# Patient Record
Sex: MALE | Race: WHITE | NOT HISPANIC OR LATINO | Employment: FULL TIME | URBAN - NONMETROPOLITAN AREA
[De-identification: names, ages, dates, MRNs, and addresses within clinical notes are randomized per-mention and may not be internally consistent; named-entity substitution may affect disease eponyms.]

---

## 2017-01-04 ENCOUNTER — HOSPITAL ENCOUNTER (EMERGENCY)
Facility: HOSPITAL | Age: 22
Discharge: HOME | End: 2017-01-04
Attending: PHYSICIAN ASSISTANT | Admitting: PHYSICIAN ASSISTANT
Payer: COMMERCIAL

## 2017-01-04 VITALS
OXYGEN SATURATION: 97 % | TEMPERATURE: 97.9 F | DIASTOLIC BLOOD PRESSURE: 81 MMHG | HEART RATE: 76 BPM | RESPIRATION RATE: 14 BRPM | SYSTOLIC BLOOD PRESSURE: 137 MMHG

## 2017-01-04 DIAGNOSIS — S46.911A MUSCLE STRAIN OF SCAPULAR REGION, RIGHT, INITIAL ENCOUNTER: ICD-10-CM

## 2017-01-04 PROCEDURE — 73030 X-RAY EXAM OF SHOULDER: CPT | Mod: TC,RT

## 2017-01-04 PROCEDURE — 99213 OFFICE O/P EST LOW 20 MIN: CPT

## 2017-01-04 PROCEDURE — 99213 OFFICE O/P EST LOW 20 MIN: CPT | Performed by: PHYSICIAN ASSISTANT

## 2017-01-04 RX ORDER — CYCLOBENZAPRINE HCL 10 MG
5-10 TABLET ORAL 3 TIMES DAILY PRN
Qty: 20 TABLET | Refills: 0 | Status: SHIPPED | OUTPATIENT
Start: 2017-01-04 | End: 2017-01-10

## 2017-01-04 RX ORDER — KETOROLAC TROMETHAMINE 10 MG/1
10 TABLET, FILM COATED ORAL EVERY 6 HOURS PRN
Qty: 20 TABLET | Refills: 0 | Status: SHIPPED | OUTPATIENT
Start: 2017-01-04 | End: 2017-01-09

## 2017-01-04 ASSESSMENT — ENCOUNTER SYMPTOMS
CARDIOVASCULAR NEGATIVE: 1
JOINT SWELLING: 0
ARTHRALGIAS: 1
CONSTITUTIONAL NEGATIVE: 1
NEUROLOGICAL NEGATIVE: 1
PSYCHIATRIC NEGATIVE: 1
MYALGIAS: 1
BACK PAIN: 0
RESPIRATORY NEGATIVE: 1

## 2017-01-04 NOTE — ED PROVIDER NOTES
History     Chief Complaint   Patient presents with     Shoulder Pain     X 6-7 months off and on     The history is provided by the patient. No  was used.     Javier Martinez is a 21 year old male who presents with right shoulder pain.   MARY: None. Pt reports pain is made worse after activities like shoveling yesterday. Often pain is better after disc-golf.   Pain is described as ripping to sharp and is located right posterior shoulder.  Treatments tried thus far include nothing today.   Patient denies any additional symptoms. NO rashes/shingles.     I have reviewed the Medications, Allergies, Past Medical  History in the Epic system.    Review of Systems   Constitutional: Negative.    Respiratory: Negative.    Cardiovascular: Negative.    Musculoskeletal: Positive for myalgias and arthralgias. Negative for back pain and joint swelling.   Skin: Negative.    Neurological: Negative.    Psychiatric/Behavioral: Negative.        Physical Exam   BP: 137/81 mmHg  Pulse: 76  Temp: 97.9  F (36.6  C)  Resp: 14  SpO2: 97 %  Physical Exam   Constitutional: He is oriented to person, place, and time. He appears well-developed and well-nourished. No distress.   Cardiovascular: Normal rate.    Pulmonary/Chest: Effort normal.   Musculoskeletal:        Right shoulder: He exhibits tenderness.        Arms:  RIGHT shoulder with NO gross deformity or skin lesions. GH NONtender. AC NONtender. 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 Patient can touch opposite shoulder, but causes posterior shoulder pain. Apley scratch to lower thoracic area. Lift off testing hurts posterior shoulder. Empty can NEGATIVE. Neers NEGATIVE. Patient can wiggle fingers. Sensation to light touch intact at fingertips. Cap refill at fingertips 2 seconds.    Neurological: He is alert and oriented to person, place, and time.   Skin: Skin is warm and dry.   Psychiatric: He has a normal mood and affect.   Nursing note and vitals reviewed.      ED  Course   Procedures       RIGHT SHOULDER FOUR VIEWS     FINDINGS:  The acromioclavicular and glenohumeral articulations are  normally aligned.  The distal clavicle, scapula and proximal humerus  appear intact.     IMPRESSION:  NEGATIVE FOUR VIEW EXAMINATION OF THE RIGHT SHOULDER.  Exam Date: Jan 04, 2017 01:41:30 PM  Author: JOHANNY PRATHER  This report is preliminary and transcribed       Assessments & Plan (with Medical Decision Making)     I have reviewed the nursing notes.    I have reviewed the findings, diagnosis, plan and need for follow up with the patient.    Discharge Medication List as of 1/4/2017  2:14 PM      START taking these medications    Details   ketorolac (TORADOL) 10 MG tablet Take 1 tablet (10 mg) by mouth every 6 hours as needed for moderate pain, Disp-20 tablet, R-0, E-Prescribe      cyclobenzaprine (FLEXERIL) 10 MG tablet Take 0.5-1 tablets (5-10 mg) by mouth 3 times daily as needed, Disp-20 tablet, R-0, E-Prescribe             Final diagnoses:   Muscle strain of scapular region, right, initial encounter   Scapula with no bony abnormality on XR. Will treat as muscle strain as above. Pt given home upper back/shoulder exercises if this does NOT make pain worse. He is advised to f/u with sports medicine vs PCP with ongoing pain despite treatment. Seek attention sooner with worsening pain despite treatment. Patient verbally educated and given appropriate education sheets for each of the diagnoses and has no questions.    Christiano Montague PA-C   1/4/2017   6:29 PM    1/4/2017   [facility]      Christiano Montague PA  01/04/17 2401

## 2017-01-04 NOTE — ED NOTES
Pt presents today with a friend for c/o right shoulder pain for 6-7 months but yesterday started having shooting pains down his back from it, was shoveling last night and couldn't sleep due to the pain.

## 2017-01-04 NOTE — ED AVS SNAPSHOT
[facility]    [address]    [place] [address]    Phone:  [phone]                                       Javier Martinez   MRN: [id number]    Department:  [facility]   Date of Visit:  1/4/2017           Patient Information     Date Of Birth          1995        Your diagnoses for this visit were:     Muscle strain of scapular region, right, initial encounter        You were seen by Christiano Montague PA.      Follow-up Information     Please follow up.    Why:  ortho 10-14 days as needed        Discharge Instructions       - Rest, ice/heat rotation  - Toradol 3-4 x daily for 2-3 days  - Flexeril 3x daily for 2-3 days  - Stretches/home rehab  - FYI Topicals: Arnica Cream (5$ at [employer]) and/or Capsaicin. (1/4 teaspoon cayenne pepper in a palmful olive oil and rub in 2-3 times daily for 5-7 days for pain)    * No better in 10-14 days, see sports medicine or primary care    Discharge References/Attachments     MUSCLE STRAIN, EXTREMITY (ENGLISH)         Review of your medicines      START taking        Dose / Directions Last dose taken    cyclobenzaprine 10 MG tablet   Commonly known as:  FLEXERIL   Dose:  5-10 mg   Quantity:  20 tablet        Take 0.5-1 tablets (5-10 mg) by mouth 3 times daily as needed   Refills:  0        ketorolac 10 MG tablet   Commonly known as:  TORADOL   Dose:  10 mg   Quantity:  20 tablet        Take 1 tablet (10 mg) by mouth every 6 hours as needed for moderate pain   Refills:  0          Our records show that you are taking the medicines listed below. If these are incorrect, please call your family doctor or clinic.        Dose / Directions Last dose taken    IBUPROFEN PO   Dose:  800 mg        Take 800 mg by mouth   Refills:  0                Prescriptions were sent or printed at these locations (2 Prescriptions)                   [facility] - [place] - [address]   [address] [facility] [address]    Telephone:   "[phone]   Fax:  [phone]   Hours:                  E-Prescribed (2 of 2)         ketorolac (TORADOL) 10 MG tablet               cyclobenzaprine (FLEXERIL) 10 MG tablet                Procedures and tests performed during your visit     Shoulder XR, G/E 3 views, right      Orders Needing Specimen Collection     None      Pending Results     Date and Time Order Name Status Description    2017 1324 Shoulder XR, G/E 3 views, right In process             Pending Culture Results     No orders found from 1/3/2017 to 2017.            Thank you for choosing [facility]       Thank you for choosing [facility] for your care. Our goal is always to provide you with excellent care. Hearing back from our patients is one way we can continue to improve our services. Please take a few minutes to complete the written survey that you may receive in the mail after you visit with us. Thank you!        [employer] Information     [facility] lets you send messages to your doctor, view your test results, renew your prescriptions, schedule appointments and more. To sign up, go to [email or web address] . Click on \"Log in\" on the left side of the screen, which will take you to the Welcome page. Then click on \"Sign up Now\" on the right side of the page.     You will be asked to enter the access code listed below, as well as some personal information. Please follow the directions to create your username and password.     Your access code is: [email or web address]  Expires: 2017  2:14 PM     Your access code will  in 90 days. If you need help or a new code, please call your [facility] clinic or [phone].        Care EveryWhere ID     This is your Care EveryWhere ID. This could be used by other organizations to access your [facility] medical records  [id number]        After Visit Summary       This is your record. Keep this with you and show to your community pharmacist(s) and doctor(s) at your next visit.                  "

## 2017-01-04 NOTE — ED AVS SNAPSHOT
[facility]    [address]    [place] [address]    Phone:  [phone]                                       Javier Martinez   MRN: [id number]    Department:  [facility]   Date of Visit:  1/4/2017           After Visit Summary Signature Page     I have received my discharge instructions, and my questions have been answered. I have discussed any challenges I see with this plan with the nurse or doctor.    ..........................................................................................................................................  Patient/Patient Representative Signature      ..........................................................................................................................................  Patient Representative Print Name and Relationship to Patient    ..................................................               ................................................  Date                                            Time    ..........................................................................................................................................  Reviewed by Signature/Title    ...................................................              ..............................................  Date                                                            Time

## 2017-01-04 NOTE — DISCHARGE INSTRUCTIONS
- Rest, ice/heat rotation  - Toradol 3-4 x daily for 2-3 days  - Flexeril 3x daily for 2-3 days  - Stretches/home rehab  - FYI Topicals: Arnica Cream (5$ at [employer]) and/or Capsaicin. (1/4 teaspoon cayenne pepper in a palmful olive oil and rub in 2-3 times daily for 5-7 days for pain)    * No better in 10-14 days, see sports medicine or primary care

## 2017-09-29 ENCOUNTER — HOSPITAL ENCOUNTER (EMERGENCY)
Facility: HOSPITAL | Age: 22
Discharge: HOME | End: 2017-09-29
Attending: PHYSICIAN ASSISTANT | Admitting: PHYSICIAN ASSISTANT

## 2017-09-29 VITALS
OXYGEN SATURATION: 98 % | HEART RATE: 76 BPM | SYSTOLIC BLOOD PRESSURE: 132 MMHG | DIASTOLIC BLOOD PRESSURE: 86 MMHG | RESPIRATION RATE: 15 BRPM | TEMPERATURE: 97.6 F

## 2017-09-29 DIAGNOSIS — R10.9 ABDOMINAL PAIN OF UNKNOWN ETIOLOGY: ICD-10-CM

## 2017-09-29 PROCEDURE — 99282 EMERGENCY DEPT VISIT SF MDM: CPT

## 2017-09-29 PROCEDURE — 99282 EMERGENCY DEPT VISIT SF MDM: CPT | Performed by: PHYSICIAN ASSISTANT

## 2017-09-29 ASSESSMENT — ENCOUNTER SYMPTOMS
ABDOMINAL PAIN: 1
FATIGUE: 0
NAUSEA: 1
SHORTNESS OF BREATH: 0
ACTIVITY CHANGE: 0
DIARRHEA: 1
CHILLS: 0
APPETITE CHANGE: 1
VOMITING: 1

## 2017-09-29 NOTE — ED NOTES
Presents with hx of waking at 0600 today with stabbing epigastric pain, nausea and vomited X6. States had one lose stool today.

## 2017-09-29 NOTE — ED AVS SNAPSHOT
[facility]    [address] [address]    Phone:  [phone]                                       Javier Martinez   MRN: [id number]    Department:  [facility]   Date of Visit:  9/29/2017           After Visit Summary Signature Page     I have received my discharge instructions, and my questions have been answered. I have discussed any challenges I see with this plan with the nurse or doctor.    ..........................................................................................................................................  Patient/Patient Representative Signature      ..........................................................................................................................................  Patient Representative Print Name and Relationship to Patient    ..................................................               ................................................  Date                                            Time    ..........................................................................................................................................  Reviewed by Signature/Title    ...................................................              ..............................................  Date                                                            Time

## 2017-09-29 NOTE — ED NOTES
"Ambulated into ED room 1 independently with c/o nausea, vomiting, and diarrhea that started this morning. Patient also c/o abdominal pain rated 5/10 that was like \"indigestion.\" Reports taking tums but says he vomited them up. States he is just here for a doctor's excuse for work because his job requires it. Was going to start an IV but patient requested no IV and no labs and again stated, \"I'm sure it's just a stomach bug but I have to come in for a work excuse, I really don't want all of that.\" Informed JACQUE Esposito. Call light within reach.   "

## 2017-09-29 NOTE — ED AVS SNAPSHOT
[facility]    [address] [address]    Phone:  [phone]                                       Javier Martinez   MRN: [id number]    Department:  [facility]   Date of Visit:  9/29/2017           Patient Information     Date Of Birth          1995        Your diagnoses for this visit were:     Abdominal pain of unknown etiology        You were seen by Maynor Waggoner PA-C.      Follow-up Information     Follow up with [facility].    Specialty:  EMERGENCY MEDICINE    Why:  If symptoms worsen    Contact information:    [address] [address] [phone]    Additional information:    From [place]: Take [address]. Turn left at [address]. Turn left at the first stoplight on [address]. At the first stop sign, take a right onto [address]. Take a left into the parking lot and continue through until you reach the North enterance of the building.       From [place]: Take [address]. Take the [address] ramp towards [place]. Turn left onto [address]. Take a slight right onto [address]. Turn left at the first stoplight on [address]. At the first stop sign, take a right onto [address]. Take a left into the parking lot and continue through until you reach the North enterance of the building.       From [place]: Take [address]. Take a right at [address]. At the first stop sign, take a right onto [address]. Take a left into the parking lot and continue through until you reach the North enterance of the building.         Discharge Instructions       Rest and stay hydrated.    Return here for any worsening symptoms, fevers, or other concerns.        Review of your medicines      Notice     You have not been prescribed any medications.            Orders Needing Specimen Collection     None      Pending Results     No orders found  "from 2017 to 2017.            Pending Culture Results     No orders found from 2017 to 2017.            Thank you for choosing [facility]       Thank you for choosing [facility] for your care. Our goal is always to provide you with excellent care. Hearing back from our patients is one way we can continue to improve our services. Please take a few minutes to complete the written survey that you may receive in the mail after you visit with us. Thank you!        [employer] Information     [employer] lets you send messages to your doctor, view your test results, renew your prescriptions, schedule appointments and more. To sign up, go to [email or web address] . Click on \"Log in\" on the left side of the screen, which will take you to the Welcome page. Then click on \"Sign up Now\" on the right side of the page.     You will be asked to enter the access code listed below, as well as some personal information. Please follow the directions to create your username and password.     Your access code is: [email or web address]  Expires: 2017 10:25 AM     Your access code will  in 90 days. If you need help or a new code, please call your [facility] clinic or [phone].        Care EveryWhere ID     This is your Care EveryWhere ID. This could be used by other organizations to access your [facility] medical records  [id number]        Equal Access to Services     HENNA MEADE AH: Hadii filomena Spears, waaxda abilio, qaybta lisaalivory butcher . So [facility] [phone].    ATENCIÓN: Si habla español, tiene a velazquez disposición servicios gratuitos de asistencia lingüística. Felicia al [phone].    We comply with applicable federal civil rights laws and [place] laws. We do not discriminate on the basis of race, color, national origin, age, disability sex, sexual orientation or gender identity.            After Visit Summary       This is your record. Keep this with you and " show to your community pharmacist(s) and doctor(s) at your next visit.

## 2017-09-29 NOTE — ED PROVIDER NOTES
History     Chief Complaint   Patient presents with     Abdominal Pain     stabbing pain in epigastric area since 0600 this am. Vomited X6.     The history is provided by the patient.     Javier Martinez is a 22 year old male who presented to the ED ambulatory along with SO for evaluation of abdominal pain and vomiting.  One episode of diarrhea.  No fevers.  Generalized abdominal pain.  No hematochezia or hematemesis.  No LUTS.  Symptoms have been present for approx 3 hours.      I have reviewed the Medications, Allergies, Past Medical and Surgical History, and Social History in the Epic system.    Allergies:   Allergies   Allergen Reactions     Hornets Anaphylaxis         No current facility-administered medications on file prior to encounter.   No current outpatient prescriptions on file prior to encounter.    There is no problem list on file for this patient.      No past surgical history on file.    Social History   Substance Use Topics     Smoking status: Not on file     Smokeless tobacco: Not on file     Alcohol use Not on file         There is no immunization history on file for this patient.    BMI: There is no height or weight on file to calculate BMI.      Review of Systems   Constitutional: Positive for appetite change. Negative for activity change, chills and fatigue.   Respiratory: Negative for shortness of breath.    Gastrointestinal: Positive for abdominal pain, diarrhea, nausea and vomiting.   Genitourinary: Negative.    Skin: Negative.        Physical Exam   BP: 132/86  Pulse: 76  Temp: 97.6  F (36.4  C)  Resp: 15  SpO2: 98 %  Physical Exam   Constitutional: He is oriented to person, place, and time. He appears well-developed and well-nourished. No distress.   Cardiovascular: Normal rate and regular rhythm.    Pulmonary/Chest: Effort normal.   Abdominal: Soft. There is no tenderness. There is no guarding.   Neurological: He is alert and oriented to person, place, and time.   Skin: Skin is  "warm and dry.   Nursing note and vitals reviewed.      ED Course     ED Course     Procedures             Critical Care time:  none               Labs Ordered and Resulted from Time of ED Arrival Up to the Time of Departure from the ED - No data to display    Assessments & Plan (with Medical Decision Making)   Gordon refused any testing whatsoever.  He states \"I just need a work excuse.\"  Discussed risks of discharge without work-up.  He voiced understanding.  Return here for any worsening symptoms, fevers, or any other concerns.      I have reviewed the nursing notes.    I have reviewed the findings, diagnosis, plan and need for follow up with the patient.       There are no discharge medications for this patient.      Final diagnoses:   Abdominal pain of unknown etiology       9/29/2017   [facility]     Maynor Waggoner PA-C  09/29/17 1043    "

## 2017-09-29 NOTE — LETTER
[facility]  [address]  [place] [address]  Phone: [phone]    September 29, 2017        Javier Martinez  [address] [place] [address]          To whom it may concern:     Javier Martinez was seen and treated in the [facility] ED on 9/29/17.  Please excuse him from work on 9/29 and 9/30 due to illness.          Please contact me for questions or concerns.      Sincerely,              Maynor Waggoner PA-C

## 2017-09-29 NOTE — ED NOTES
Discharge instructions given. Verbalized understanding. Work excuse for 9/29/17 and 9/30/17 handed to patient with discharge instructions. Patient again stated he did not want any tests or IV and just had to be seen due to a work excuse being required. Ambulated out of ED independently with girlfriend at side.

## 2019-02-12 ENCOUNTER — HOSPITAL ENCOUNTER (EMERGENCY)
Facility: HOSPITAL | Age: 24
Discharge: HOME | End: 2019-02-12
Attending: INTERNAL MEDICINE | Admitting: INTERNAL MEDICINE
Payer: OTHER MISCELLANEOUS

## 2019-02-12 ENCOUNTER — APPOINTMENT (OUTPATIENT)
Dept: GENERAL RADIOLOGY | Facility: HOSPITAL | Age: 24
End: 2019-02-12
Attending: INTERNAL MEDICINE
Payer: OTHER MISCELLANEOUS

## 2019-02-12 VITALS
RESPIRATION RATE: 18 BRPM | TEMPERATURE: 98.5 F | SYSTOLIC BLOOD PRESSURE: 157 MMHG | OXYGEN SATURATION: 100 % | DIASTOLIC BLOOD PRESSURE: 99 MMHG | HEART RATE: 90 BPM

## 2019-02-12 DIAGNOSIS — S66.912A STRAIN OF LEFT WRIST, INITIAL ENCOUNTER: ICD-10-CM

## 2019-02-12 PROCEDURE — 73110 X-RAY EXAM OF WRIST: CPT | Mod: TC,LT

## 2019-02-12 PROCEDURE — 99283 EMERGENCY DEPT VISIT LOW MDM: CPT

## 2019-02-12 PROCEDURE — 99283 EMERGENCY DEPT VISIT LOW MDM: CPT | Mod: Z6 | Performed by: INTERNAL MEDICINE

## 2019-02-12 PROCEDURE — 25000132 ZZH RX MED GY IP 250 OP 250 PS 637: Performed by: INTERNAL MEDICINE

## 2019-02-12 RX ORDER — IBUPROFEN 800 MG/1
800 TABLET, FILM COATED ORAL ONCE
Status: COMPLETED | OUTPATIENT
Start: 2019-02-12 | End: 2019-02-12

## 2019-02-12 RX ORDER — NAPROXEN 500 MG/1
500 TABLET ORAL 2 TIMES DAILY WITH MEALS
Qty: 6 TABLET | Refills: 0 | Status: SHIPPED | OUTPATIENT
Start: 2019-02-12 | End: 2019-02-15

## 2019-02-12 RX ADMIN — IBUPROFEN 800 MG: 800 TABLET ORAL at 02:19

## 2019-02-12 NOTE — ED AVS SNAPSHOT
[facility]  [address] [address]  Phone:  [phone]                                    Javier Martinez   MRN: [id number]    Department[facility]  [facility]   Date of Visit:  2/12/2019           After Visit Summary Signature Page    I have received my discharge instructions, and my questions have been answered. I have discussed any challenges I see with this plan with the nurse or doctor.    ..........................................................................................................................................  Patient/Patient Representative Signature      ..........................................................................................................................................  Patient Representative Print Name and Relationship to Patient    ..................................................               ................................................  Date                                   Time    ..........................................................................................................................................  Reviewed by Signature/Title    ...................................................              ..............................................  Date                                               Time          22EPIC Rev 08/18

## 2019-02-12 NOTE — LETTER
February 12, 2019      To Whom It May Concern:      Javier Martinez was seen in our Emergency Department today, 02/12/19.  I expect his condition to improve over the next 2 days.  He may return to work/school when improved.    Sincerely,        Dm Thomas MD

## 2019-02-12 NOTE — ED NOTES
Discharge instructions given. Verbalized understanding. Signed prescription for Naproxen and signed work excuse handed to patient with discharge paperwork. Rates pain 5/10. Ambulated out of ED independently.

## 2019-02-12 NOTE — ED NOTES
Ambulated into ED room 11 independently with c/o left wrist pain rated 7/10. States was at work when he pressed a button and felt a sudden sharp pain down left arm and now has intense pain with any movement of fingers, hand, or wrist. Call light within reach.

## 2019-02-14 NOTE — PROGRESS NOTES
Pt called with concern regarding the ability to get into Ortho for referral appointment. Pt reports he called the office and they had not received one. Upon looking in chart it appears that Dr. Thomas did want pt to follow up in 1 week with them. Call placed to [place]  with ortho for appointment referral. Called pt back and informed him that they should be calling back and if he did not hear from them to call back again.

## 2019-02-15 ASSESSMENT — ENCOUNTER SYMPTOMS
ABDOMINAL PAIN: 0
FEVER: 0
SHORTNESS OF BREATH: 0

## 2019-02-16 NOTE — ED PROVIDER NOTES
History     Chief Complaint   Patient presents with     Wrist Pain     c/o left wrist pain after pressing button at work and feeling sharp pain go up left arm, states now it is very painful to move hand, fingers, or arm.      Allergies:  Allergies   Allergen Reactions     Bee Venom Anaphylaxis     Hornets Anaphylaxis     Amoxicillin      Augmentin        Problem List:    There are no active problems to display for this patient.       Past Medical History:    No past medical history on file.    Past Surgical History:    No past surgical history on file.    Family History:    No family history on file.    Social History:  Marital Status:  Single [1]  Social History     Tobacco Use     Smoking status: Not on file   Substance Use Topics     Alcohol use: Not on file     Drug use: Not on file        Medications:      naproxen (NAPROSYN) 500 MG tablet         Review of Systems   Constitutional: Negative for fever.   Respiratory: Negative for shortness of breath.    Cardiovascular: Negative for chest pain.   Gastrointestinal: Negative for abdominal pain.   All other systems reviewed and are negative.      Physical Exam   BP: 157/99  Pulse: 90  Temp: 98.5  F (36.9  C)  Resp: 18  SpO2: 100 %      Physical Exam   Constitutional: No distress.   HENT:   Head: Atraumatic.   Mouth/Throat: Oropharynx is clear and moist.   Eyes: Pupils are equal, round, and reactive to light. No scleral icterus.   Cardiovascular: Normal heart sounds and intact distal pulses.   Pulmonary/Chest: Breath sounds normal. No respiratory distress.   Abdominal: Soft. Bowel sounds are normal. There is no tenderness.   Musculoskeletal: He exhibits no edema or tenderness.        Left wrist: He exhibits normal range of motion, no tenderness, no bony tenderness, no swelling, no effusion, no crepitus, no deformity and no laceration.        Left hand: He exhibits normal range of motion, no tenderness, no bony tenderness, no deformity and no laceration. Normal  sensation noted. Normal strength noted.   Skin: Skin is warm. No rash noted. He is not diaphoretic.       ED Course        Procedures                   No results found for this or any previous visit (from the past 24 hour(s)).    Medications   ibuprofen (ADVIL/MOTRIN) tablet 800 mg (800 mg Oral Given 2/12/19 0219)       Assessments & Plan (with Medical Decision Making)   After pressing button in his job, felt left wrist and hand pain  Able to extend and flex all fingers althtough feels pain when  Doing so  Most pain in wrist area  Xray  Negative  Likely soft tissue, ligaament/ tendon  Strain  I advised him to follow with ortho clinic,NSAIDs as needed for pain control  He understood and agreed.   I have reviewed the nursing notes.    I have reviewed the findings, diagnosis, plan and need for follow up with the patient.         Medication List      Started    naproxen 500 MG tablet  Commonly known as:  NAPROSYN  500 mg, Oral, 2 TIMES DAILY WITH MEALS            Final diagnoses:   Strain of left wrist, initial encounter       2/12/2019   [facility]    Dm Thomas MD  02/15/19 4930

## 2019-02-18 ENCOUNTER — OFFICE VISIT (OUTPATIENT)
Dept: ORTHOPEDICS | Facility: OTHER | Age: 24
End: 2019-02-18
Attending: ORTHOPAEDIC SURGERY
Payer: OTHER MISCELLANEOUS

## 2019-02-18 ENCOUNTER — TELEPHONE (OUTPATIENT)
Dept: ORTHOPEDICS | Facility: OTHER | Age: 24
End: 2019-02-18

## 2019-02-18 VITALS
SYSTOLIC BLOOD PRESSURE: 100 MMHG | HEIGHT: 75 IN | OXYGEN SATURATION: 96 % | WEIGHT: 180 LBS | BODY MASS INDEX: 22.38 KG/M2 | TEMPERATURE: 98.7 F | DIASTOLIC BLOOD PRESSURE: 62 MMHG | HEART RATE: 86 BPM

## 2019-02-18 DIAGNOSIS — S63.502A SPRAIN OF LEFT WRIST, INITIAL ENCOUNTER: Primary | ICD-10-CM

## 2019-02-18 PROCEDURE — 99203 OFFICE O/P NEW LOW 30 MIN: CPT | Performed by: ORTHOPAEDIC SURGERY

## 2019-02-18 ASSESSMENT — MIFFLIN-ST. JEOR: SCORE: 1902.1

## 2019-02-18 ASSESSMENT — PAIN SCALES - GENERAL: PAINLEVEL: NO PAIN (1)

## 2019-02-18 NOTE — PROGRESS NOTES
"New Patient Occupational Visit    Chief complaint: Acute left wrist pain at work 2/12/2019    Patient Profile 23-year-old right-handed  at [employer], non-smoker, patient of Marian Wong MD    HPI: He had no problems with his left wrist until 2/12/2019 when, in the course of his employment at [employer], he pushed a button with the proximal volar portion of his dorsiflexed left wrist and felt a painful pop in the region of the DRUJ, followed by a shooting pain running several inches up the volar forearm, numbness and tingling in the fourth and fifth fingers, and the inability to flex or extend the left wrist.    He reported the injury to his supervisor who sent him to the ED at this institution where x-rays were taken.  No bony abnormalities were seen.  He was placed in a splint which she found to be very uncomfortable in which he replaced with a wrist brace he obtained at [facility], and he was prescribed naproxen.  He was given a work note saying he could work \"light duty\" but no definition of light duty was given.  His employer wants a more detailed work note.    Subjective: Today reports that his wrist feels much better.  The pain and numbness and tingling have all resolved except for mild discomfort located near the hamate bone only with heavy lifting.  He states at work he rarely does heavy lifting.    History reviewed. No pertinent past medical history.    History reviewed. No pertinent surgical history.    No current outpatient medications on file.     Allergies   Allergen Reactions     Bee Venom Anaphylaxis     Hornets Anaphylaxis     Amoxicillin      Augmentin      Social history: He does not smoke    Family history: Noncontributory for the presenting problem    ROS: Noncontributory for constitutional, eyes, ENT, cardiovascular, respiratory, GI, endocrine, dermatologic, neurologic, psychiatric, hematologic and  systems; other than what is listed above.    Examination:/62   Pulse 86   " "Temp 98.7  F (37.1  C) (Tympanic)   Ht 1.905 m (6' 3\")   Wt 81.6 kg (180 lb)   SpO2 96%   BMI 22.50 kg/m    Healthy-appearing male with appropriate mood and affect.  He is alert and oriented.  Head: atraumatic and normocephalic.  Sclerae: clear.  Respiratory efforts: unlabored.    His left wrist has no swelling or deformity.  It, and the DRUJ,  are nontender to palpation.  Left wrist range of motion - including pronation and supination - are full and pain-free.  Finger range of motion is full and pain-free.   strength is excellent and pain-free.  The neurovascular status of that hand is intact.    X-ray; plain films of the left wrist performed on 2/12/2019 were reviewed and showed no acute bony abnormalities.    Impression: Soft tissue injury left wrist, mild, significantly improved.     Plan: He was given a work note permitting him to return to work tomorrow with no restrictions, with the option of wearing his wrist brace as needed.  He should return here if his symptoms return.                                                 "

## 2019-02-18 NOTE — TELEPHONE ENCOUNTER
Incoming call from [employer] for patient to see Ortho Dr Holt, apparently the ER providers referred patient but don't see a referral placed.      Thank you,    Nadya FORBES-P  Work Comp [address] [address]  Office: [phone] Fax [phone]

## 2019-02-18 NOTE — NURSING NOTE
"Chief Complaint   Patient presents with     Pain     NPT Left Wrist    Work Comp       Initial /62   Pulse 86   Temp 98.7  F (37.1  C) (Tympanic)   Ht 1.905 m (6' 3\")   Wt 81.6 kg (180 lb)   SpO2 96%   BMI 22.50 kg/m   Estimated body mass index is 22.5 kg/m  as calculated from the following:    Height as of this encounter: 1.905 m (6' 3\").    Weight as of this encounter: 81.6 kg (180 lb).  Medication Reconciliation: complete    Anjali De La Rosa LPN  "

## 2019-10-30 ENCOUNTER — HOSPITAL ENCOUNTER (EMERGENCY)
Facility: HOSPITAL | Age: 24
Discharge: HOME | End: 2019-10-31
Attending: FAMILY MEDICINE | Admitting: FAMILY MEDICINE

## 2019-10-30 DIAGNOSIS — J06.9 VIRAL URI WITH COUGH: Primary | ICD-10-CM

## 2019-10-30 LAB
DEPRECATED S PYO AG THROAT QL EIA: NORMAL
FLUAV+FLUBV RNA SPEC QL NAA+PROBE: NEGATIVE
FLUAV+FLUBV RNA SPEC QL NAA+PROBE: NEGATIVE
RSV RNA SPEC NAA+PROBE: NEGATIVE
SPECIMEN SOURCE: NORMAL
SPECIMEN SOURCE: NORMAL

## 2019-10-30 PROCEDURE — 87880 STREP A ASSAY W/OPTIC: CPT | Performed by: FAMILY MEDICINE

## 2019-10-30 PROCEDURE — 87631 RESP VIRUS 3-5 TARGETS: CPT | Performed by: FAMILY MEDICINE

## 2019-10-30 PROCEDURE — 99283 EMERGENCY DEPT VISIT LOW MDM: CPT

## 2019-10-30 PROCEDURE — 99283 EMERGENCY DEPT VISIT LOW MDM: CPT | Mod: Z6 | Performed by: FAMILY MEDICINE

## 2019-10-30 PROCEDURE — 87081 CULTURE SCREEN ONLY: CPT | Performed by: FAMILY MEDICINE

## 2019-10-30 ASSESSMENT — ENCOUNTER SYMPTOMS
ABDOMINAL PAIN: 0
NECK STIFFNESS: 0
DIZZINESS: 0
CONFUSION: 0
FEVER: 0
WHEEZING: 0
EYE REDNESS: 0
DIFFICULTY URINATING: 0
COLOR CHANGE: 0
ARTHRALGIAS: 0

## 2019-10-30 NOTE — ED AVS SNAPSHOT
[facility]  [address] [address]  Phone:  [phone]                                    Javier Martinez   MRN: [id number]    Department:  [facility]   Date of Visit:  10/30/2019           After Visit Summary Signature Page    I have received my discharge instructions, and my questions have been answered. I have discussed any challenges I see with this plan with the nurse or doctor.    ..........................................................................................................................................  Patient/Patient Representative Signature      ..........................................................................................................................................  Patient Representative Print Name and Relationship to Patient    ..................................................               ................................................  Date                                   Time    ..........................................................................................................................................  Reviewed by Signature/Title    ...................................................              ..............................................  Date                                               Time          22EPIC Rev 08/18

## 2019-10-30 NOTE — LETTER
October 31, 2019      To Whom It May Concern:      Javier Martinez was seen in our Emergency Department today, 10/31/19.  I expect his condition to improve over the next several days.  He may return to work on Monday, 11/4/19.    Sincerely,        Darrell Woo MD

## 2019-10-31 VITALS
RESPIRATION RATE: 16 BRPM | SYSTOLIC BLOOD PRESSURE: 152 MMHG | OXYGEN SATURATION: 98 % | TEMPERATURE: 97.9 F | DIASTOLIC BLOOD PRESSURE: 100 MMHG | HEART RATE: 68 BPM

## 2019-10-31 NOTE — ED NOTES
"Pt reports he has bee, \"hot and cold all day, I have been coughing, I am nauseated and have vomited x2 today, and I am aching all over and I have a migraine and I have a sore throat.\"  Pt states he has had clear nasal drainage but is coughing up clear and green secretions.   "

## 2019-10-31 NOTE — ED NOTES
Discharge teaching done, AVS reviewed with pt, work excuse provided. Pt verbalized understanding and is agreeable with discharge plan. Pt discharged to home.

## 2019-10-31 NOTE — ED PROVIDER NOTES
History     Chief Complaint   Patient presents with     Pharyngitis     HPI  Javier Martinez is a 24 year old man who presents with 1 day of URI S/Sx including subjective fevers/chills, generalized muscle aches, sore throat and headache as well as rhinorrhea and a cough productive of clear and green secretions. No progressive shortness of breath, ear pain or neck stiffness. He was exposed to a friend with similar S/Sx last week.    Allergies:  Allergies   Allergen Reactions     Bee Venom Anaphylaxis     Hornets Anaphylaxis     Amoxicillin      Augmentin        Problem List:    There are no active problems to display for this patient.       Past Medical History:    No past medical history on file.    Past Surgical History:    No past surgical history on file.    Family History:    No family history on file.    Social History:  Marital Status:  Single [1]  Social History     Tobacco Use     Smoking status: Never Smoker     Smokeless tobacco: Never Used   Substance Use Topics     Alcohol use: Not on file     Drug use: Not on file        Medications:    No current outpatient medications on file.        Review of Systems   Constitutional: Negative for fever.   HENT: Negative for congestion.    Eyes: Negative for redness.   Respiratory: Negative for wheezing.    Cardiovascular: Negative for chest pain.   Gastrointestinal: Negative for abdominal pain.   Genitourinary: Negative for difficulty urinating.   Musculoskeletal: Negative for arthralgias and neck stiffness.   Skin: Negative for color change.   Neurological: Negative for dizziness.   Psychiatric/Behavioral: Negative for confusion.       Physical Exam   BP: 140/94  Pulse: 83  Temp: 97.6  F (36.4  C)  Resp: 16  SpO2: 100 %      Physical Exam    General Appearance: well-developed, well-nourished, alert & oriented, no apparent distress.    HEENT: atraumatic. Pupils equal, round & reactive to light, extraocular movements intact. Bilateral ear canals and TMs  clear. Erythematous nasal passages with yellow rhinorrhea. No epistaxis. Erythematous oropharynx without exudate. Moist mucous membranes.    Neck: normal inspection, non-tender, full & painless ROM, supple, no lymphadenopathy or nuchal rigidity. No jugular venous distension.    Cardiovascular: regular rate, rhythm, normal S1 & S2, no murmurs, rubs or gallops.    Respiratory: clear lung sounds with good air entry, no wheezes rales or rhonchi, no acute respiratory distress.    Gastrointestinal: normal inspection, normal bowel sounds, non-tender, no masses or organomegaly.    Extremities: normal inspection, 2+/4+ pulses bilaterally, normal & painless ROM, non-tender, joints normal.    Neurologic: CN II - XII intact, no motor/sensory deficit.    Skin: normal color, no skin rash.    ED Course        Procedures        Results for orders placed or performed during the hospital encounter of 10/30/19 (from the past 24 hour(s))   Rapid strep screen   Result Value Ref Range    Specimen Description Throat     Rapid Strep A Screen       NEGATIVE: No Group A streptococcal antigen detected by immunoassay, await culture report.   Influenza A and B and RSV PCR   Result Value Ref Range    Specimen Description Nasopharyngeal     Influenza A PCR Negative NEG^Negative    Influenza B PCR Negative NEG^Negative    Resp Syncytial Virus Negative NEG^Negative       Medications - No data to display    Assessments & Plan (with Medical Decision Making)   The patient is a 24 year old man with a viral URI with cough.    1) Viral URI with cough - patient will be treated with rest, oral fluids and alternating APAP/ibuprofen.    Plan for PCP follow-up in 5 - 7 days regarding this ER visit. The patient verbalizes agreement with this plan as well as to immediately return to the ER with any new/worsening S/Sx.      I have reviewed the nursing notes.    I have reviewed the findings, diagnosis, plan and need for follow up with the patient.    New  Prescriptions    No medications on file       Final diagnoses:   Viral URI with cough       10/30/2019   [facility]     Darrell Woo MD  10/31/19 0012

## 2019-11-01 LAB
BACTERIA SPEC CULT: NORMAL
SPECIMEN SOURCE: NORMAL

## 2022-06-19 ENCOUNTER — HOSPITAL ENCOUNTER (EMERGENCY)
Facility: HOSPITAL | Age: 27
Discharge: HOME | End: 2022-06-19
Attending: PHYSICIAN ASSISTANT | Admitting: PHYSICIAN ASSISTANT
Payer: COMMERCIAL

## 2022-06-19 ENCOUNTER — APPOINTMENT (OUTPATIENT)
Dept: GENERAL RADIOLOGY | Facility: HOSPITAL | Age: 27
End: 2022-06-19
Attending: PHYSICIAN ASSISTANT
Payer: COMMERCIAL

## 2022-06-19 VITALS
RESPIRATION RATE: 16 BRPM | DIASTOLIC BLOOD PRESSURE: 79 MMHG | OXYGEN SATURATION: 97 % | TEMPERATURE: 97.5 F | HEART RATE: 90 BPM | SYSTOLIC BLOOD PRESSURE: 125 MMHG

## 2022-06-19 DIAGNOSIS — S93.401A SPRAIN OF RIGHT ANKLE, UNSPECIFIED LIGAMENT, INITIAL ENCOUNTER: ICD-10-CM

## 2022-06-19 PROCEDURE — G0463 HOSPITAL OUTPT CLINIC VISIT: HCPCS

## 2022-06-19 PROCEDURE — 73610 X-RAY EXAM OF ANKLE: CPT | Mod: RT

## 2022-06-19 PROCEDURE — 99213 OFFICE O/P EST LOW 20 MIN: CPT | Performed by: PHYSICIAN ASSISTANT

## 2022-06-19 ASSESSMENT — ENCOUNTER SYMPTOMS
JOINT SWELLING: 1
NEUROLOGICAL NEGATIVE: 1
PSYCHIATRIC NEGATIVE: 1
ARTHRALGIAS: 1
CARDIOVASCULAR NEGATIVE: 1
CONSTITUTIONAL NEGATIVE: 1
RESPIRATORY NEGATIVE: 1

## 2022-06-19 NOTE — ED TRIAGE NOTES
Pt presents with c/o right ankle pain onset 2 weeks ago, pt reports pain has not gotten better.Pt ambulatory IND in triage.

## 2022-06-19 NOTE — DISCHARGE INSTRUCTIONS
- Boots and splint. Avoid floppy shoes for now.   - RICE  [facility] in 7-10 days if no improvement or concern for weight bearing/strength/ range of motion.    Orthopedics & Sports Medicine - [facility]  Make an Appointment  [phone]    [facility]  [phone] [address] [facility] [address] [place] [address]

## 2022-06-19 NOTE — ED PROVIDER NOTES
History     Chief Complaint   Patient presents with     Ankle Pain     HPI  Javier Martinez is a 27 year old male who presents with right ankle injury that occurred 2 weeks ago.  Patient reports mis-stepping causing inversion/eversion injury.  He bought an over-the-counter brace that has been helping.  He reports he is grossly asymptomatic when he is wearing his work boots in the brace, however in tennis shoes he does feel a little more pain and notices swelling.  Due to duration of symptoms, he is in today to make sure he did not break anything.    Allergies:  Allergies   Allergen Reactions     Bee Venom Anaphylaxis     Hornets Anaphylaxis     Amoxicillin      Augmentin        Problem List:    There are no problems to display for this patient.       Past Medical History:    No past medical history on file.    Past Surgical History:    No past surgical history on file.    Family History:    No family history on file.    Social History:  Marital Status:  Single [1]  Social History     Tobacco Use     Smoking status: Never Smoker     Smokeless tobacco: Never Used        Medications:    No current outpatient medications on file.        Review of Systems   Constitutional: Negative.    Respiratory: Negative.    Cardiovascular: Negative.    Musculoskeletal: Positive for arthralgias and joint swelling.   Skin: Negative.    Neurological: Negative.    Psychiatric/Behavioral: Negative.        Physical Exam   BP: 125/79  Pulse: 90  Temp: 97.5  F (36.4  C)  Resp: 16  SpO2: 97 %      Physical Exam  Vitals and nursing note reviewed.   Constitutional:       General: He is not in acute distress.     Appearance: He is well-developed.   Cardiovascular:      Rate and Rhythm: Normal rate.   Pulmonary:      Effort: Pulmonary effort is normal.   Musculoskeletal:      Left ankle: Swelling present. Tenderness present over the lateral malleolus and ATF ligament. Normal range of motion (Pain with inversion, but does not limit range  of motion.).        Legs:    Skin:     General: Skin is warm and dry.   Neurological:      Mental Status: He is alert and oriented to person, place, and time.         ED Course       Results for orders placed or performed during the hospital encounter of 06/19/22 (from the past 24 hour(s))   Ankle XR, G/E 3 views, right    Narrative    XR ANKLE RIGHT G/E 3 VIEWS    HISTORY: 27 years Male pain and bruising inside and outside of ankle    COMPARISON: None    TECHNIQUE: Right ankle 3 views    FINDINGS: The mortise is congruent. There is mild lateral soft tissue  edema. There are some bony proliferation of the inferior aspect of the  fibula less likely a small avulsion fracture associated with  ligamentous injury.      Impression    IMPRESSION: Degenerative calcification versus small cortical avulsion  fracture of the tip of the fibula.. Ankle mortise is congruent. There  is no evidence of fracture or dislocation otherwise.    REESE NORMAN MD         SYSTEM ID:  [id number]       Medications - No data to display    Assessments & Plan (with Medical Decision Making)     I have reviewed the nursing notes.I have reviewed the findings, diagnosis, plan and need for follow up with the patient.    New Prescriptions    No medications on file     Final diagnoses:   Sprain of right ankle, unspecified ligament, initial encounter   Calcification versus small avulsion distal fib.  Patient reports that since she has been ambulatory and essentially pain-free at with the brace and in his boot he is comfortable to continue to RICE and monitor for the next week.  I did place referral to orthopedics for follow-up should he have any ongoing symptoms after week 3.  He will seek care with any concerns for pain or swelling out of proportion prior.    6/19/2022   [facility]     Christiano Montague PA  06/19/22 8565

## 2022-06-19 NOTE — ED TRIAGE NOTES
Patient presents to urgent care for right ankle pain that happened 2 weeks ago from walking. Patient states he rolled his ankle and heard a pop than and he iced it and it bruised and than he bought a brace and has been wearing the brace randomly due to the pain.  Patient has been alternating between ibuprofen and tylenol.  Patient came into urgent care because he states it's not getting better after 2 weeks.     Triage Assessment     Row Name 06/19/22 1596       Triage Assessment (Adult)    Airway WDL WDL       Respiratory WDL    Respiratory WDL WDL       Cardiac WDL    Cardiac WDL WDL       Cognitive/Neuro/Behavioral WDL    Cognitive/Neuro/Behavioral WDL WDL

## 2022-08-22 NOTE — PROGRESS NOTES
"  Assessment & Plan     Allergy to bee sting  He is in need of new epipen due to current one being . Reordered.   Resent medication to [facility] instead.  - EPINEPHrine (ANY BX GENERIC EQUIV) 0.3 MG/0.3ML injection 2-pack; Inject 0.3 mLs (0.3 mg) into the muscle as needed for anaphylaxis May repeat one time in 5-15 minutes if response to initial dose is inadequate.    History of anaphylaxis    - EPINEPHrine (ANY BX GENERIC EQUIV) 0.3 MG/0.3ML injection 2-pack; Inject 0.3 mLs (0.3 mg) into the muscle as needed for anaphylaxis May repeat one time in 5-15 minutes if response to initial dose is inadequate.       BMI:   Estimated body mass index is 25.6 kg/m  as calculated from the following:    Height as of this encounter: 1.854 m (6' 1\").    Weight as of this encounter: 88 kg (194 lb).   Weight management plan: Discussed healthy diet and exercise guidelines      No follow-ups on file.    Lianne Castro, [facility] DANNI Herrera is a 27 year old, presenting for the following health issues:  Establish Care      HPI   Establish Care  Patient is requesting epi pen refill    Last stung about a year ago and did have significant swelling to ear and throat started closing. He has had an epipen for emergencies for 21 years and verbalizes understanding of how to use it.     Nicotine status: has quit smoking. Does still vape nicotine up to once a day. Declines formal quit plan referral or interventions.   He reports that he has quit smoking. His smoking use included other. He has never used smokeless tobacco.      Advanced care planning discussed. He indicates he is full code and declines our honoring [employer] paperwork.       Review of Systems   Constitutional, HEENT, cardiovascular, pulmonary, gi and gu systems are negative, except as otherwise noted.      Objective    /74 (BP Location: Right arm, Patient Position: Sitting, Cuff Size: Adult Regular)   Pulse 67   Temp 97.9 " " F (36.6  C) (Tympanic)   Resp 12   Ht 1.854 m (6' 1\")   Wt 88 kg (194 lb)   SpO2 98%   BMI 25.60 kg/m    Body mass index is 25.6 kg/m .  Physical Exam   GENERAL: healthy, alert and no distress  EYES: Eyes grossly normal to inspection, PERRL and conjunctivae and sclerae normal  HENT: ear canals and TM's normal, nose and mouth without ulcers or lesions  NECK: no adenopathy, no asymmetry, masses, or scars and thyroid normal to palpation  RESP: lungs clear to auscultation - no rales, rhonchi or wheezes  CV: regular rate and rhythm, normal S1 S2, no S3 or S4, no murmur, click or rub, no peripheral edema and peripheral pulses strong  ABDOMEN: soft, nontender, no hepatosplenomegaly, no masses and bowel sounds normal  MS: no gross musculoskeletal defects noted, no edema  NEURO: Normal strength and tone, mentation intact and speech normal  PSYCH: mentation appears normal, affect normal/bright              .  ..  "

## 2022-08-23 ENCOUNTER — OFFICE VISIT (OUTPATIENT)
Dept: FAMILY MEDICINE | Facility: OTHER | Age: 27
End: 2022-08-23
Attending: NURSE PRACTITIONER
Payer: COMMERCIAL

## 2022-08-23 VITALS
RESPIRATION RATE: 12 BRPM | DIASTOLIC BLOOD PRESSURE: 74 MMHG | WEIGHT: 194 LBS | TEMPERATURE: 97.9 F | BODY MASS INDEX: 25.71 KG/M2 | HEART RATE: 67 BPM | SYSTOLIC BLOOD PRESSURE: 118 MMHG | HEIGHT: 73 IN | OXYGEN SATURATION: 98 %

## 2022-08-23 DIAGNOSIS — Z87.892 HISTORY OF ANAPHYLAXIS: ICD-10-CM

## 2022-08-23 DIAGNOSIS — Z91.030 ALLERGY TO BEE STING: Primary | ICD-10-CM

## 2022-08-23 PROCEDURE — 99203 OFFICE O/P NEW LOW 30 MIN: CPT | Performed by: NURSE PRACTITIONER

## 2022-08-23 RX ORDER — EPINEPHRINE 0.3 MG/.3ML
0.3 INJECTION SUBCUTANEOUS PRN
Qty: 2 EACH | Refills: 2 | Status: SHIPPED | OUTPATIENT
Start: 2022-08-23

## 2022-08-23 RX ORDER — EPINEPHRINE 0.3 MG/.3ML
0.3 INJECTION SUBCUTANEOUS PRN
COMMUNITY

## 2022-08-23 RX ORDER — EPINEPHRINE 0.3 MG/.3ML
0.3 INJECTION SUBCUTANEOUS PRN
Qty: 2 EACH | Refills: 2 | Status: SHIPPED | OUTPATIENT
Start: 2022-08-23 | End: 2022-08-23

## 2022-08-23 RX ORDER — IBUPROFEN 800 MG/1
800 TABLET, FILM COATED ORAL EVERY 8 HOURS PRN
COMMUNITY

## 2022-08-23 ASSESSMENT — ANXIETY QUESTIONNAIRES
1. FEELING NERVOUS, ANXIOUS, OR ON EDGE: NOT AT ALL
7. FEELING AFRAID AS IF SOMETHING AWFUL MIGHT HAPPEN: NOT AT ALL
GAD7 TOTAL SCORE: 0
5. BEING SO RESTLESS THAT IT IS HARD TO SIT STILL: NOT AT ALL
6. BECOMING EASILY ANNOYED OR IRRITABLE: NOT AT ALL
2. NOT BEING ABLE TO STOP OR CONTROL WORRYING: NOT AT ALL
3. WORRYING TOO MUCH ABOUT DIFFERENT THINGS: NOT AT ALL
GAD7 TOTAL SCORE: 0

## 2022-08-23 ASSESSMENT — PATIENT HEALTH QUESTIONNAIRE - PHQ9
SUM OF ALL RESPONSES TO PHQ QUESTIONS 1-9: 2
5. POOR APPETITE OR OVEREATING: NOT AT ALL

## 2022-08-23 ASSESSMENT — PAIN SCALES - GENERAL: PAINLEVEL: NO PAIN (0)

## 2023-09-17 ENCOUNTER — HOSPITAL ENCOUNTER (EMERGENCY)
Facility: HOSPITAL | Age: 28
Discharge: HOME | End: 2023-09-17
Payer: COMMERCIAL

## 2023-09-17 VITALS
SYSTOLIC BLOOD PRESSURE: 125 MMHG | TEMPERATURE: 98.6 F | RESPIRATION RATE: 16 BRPM | WEIGHT: 193.34 LBS | OXYGEN SATURATION: 96 % | DIASTOLIC BLOOD PRESSURE: 83 MMHG | BODY MASS INDEX: 25.51 KG/M2 | HEART RATE: 109 BPM

## 2023-09-17 DIAGNOSIS — K04.7 DENTAL ABSCESS: ICD-10-CM

## 2023-09-17 PROCEDURE — 99213 OFFICE O/P EST LOW 20 MIN: CPT

## 2023-09-17 PROCEDURE — G0463 HOSPITAL OUTPT CLINIC VISIT: HCPCS

## 2023-09-17 RX ORDER — CLINDAMYCIN HCL 150 MG
450 CAPSULE ORAL 3 TIMES DAILY
Qty: 63 CAPSULE | Refills: 0 | Status: SHIPPED | OUTPATIENT
Start: 2023-09-17

## 2023-09-17 RX ORDER — CLINDAMYCIN HCL 150 MG
450 CAPSULE ORAL 3 TIMES DAILY
Qty: 63 CAPSULE | Refills: 0 | Status: SHIPPED | OUTPATIENT
Start: 2023-09-17 | End: 2023-09-17

## 2023-09-17 RX ORDER — CHLORHEXIDINE GLUCONATE ORAL RINSE 1.2 MG/ML
15 SOLUTION DENTAL 2 TIMES DAILY
Qty: 473 ML | Refills: 0 | Status: SHIPPED | OUTPATIENT
Start: 2023-09-17 | End: 2023-09-17

## 2023-09-17 RX ORDER — CHLORHEXIDINE GLUCONATE ORAL RINSE 1.2 MG/ML
15 SOLUTION DENTAL 2 TIMES DAILY
Qty: 473 ML | Refills: 0 | Status: SHIPPED | OUTPATIENT
Start: 2023-09-17

## 2023-09-17 ASSESSMENT — ENCOUNTER SYMPTOMS
DIARRHEA: 0
HEADACHES: 0
SORE THROAT: 0
LIGHT-HEADEDNESS: 0
APPETITE CHANGE: 0
COUGH: 0
DIZZINESS: 0
FEVER: 0
TROUBLE SWALLOWING: 0
ACTIVITY CHANGE: 0
WOUND: 0
ABDOMINAL PAIN: 0
EYE REDNESS: 0
EYE DISCHARGE: 0
COLOR CHANGE: 0
CHILLS: 0
NAUSEA: 1

## 2023-09-17 NOTE — ED TRIAGE NOTES
Pt presents with c/o dental pain  Bottom left side. Swelling noted   Started Friday    taking ibu and tyl today

## 2023-09-17 NOTE — ED PROVIDER NOTES
History     Chief Complaint   Patient presents with    Dental Pain     HPI  Javier Martinez is a 28 year old male who presents to the urgent care with a 3 day history of left lower dental pain. Today, he woke up with swelling to left lower jaw. He denies fevers, chills, abd pain, chest pain, shortness of breath, vomiting, diarrhea, and swallowing difficulty. He has been taking 400mg of tylenol and 800mg of ibuprofen with some reduction in pain. No recent abx. Has not seen dentist, will call tomorrow.     Allergies:  Allergies   Allergen Reactions    Bee Venom Anaphylaxis    Hornets Anaphylaxis    Amoxicillin     Amoxicillin-Pot Clavulanate        Problem List:    Patient Active Problem List    Diagnosis Date Noted    Allergic rhinitis due to other allergen 04/08/2008     Priority: Medium        Past Medical History:    No past medical history on file.    Past Surgical History:    Past Surgical History:   Procedure Laterality Date    MYRINGOTOMY, INSERT TUBE BILATERAL, COMBINED         Family History:    Family History   Problem Relation Age of Onset    Eczema Mother     Migraines Mother     Eczema Father     Migraines Father     Eczema Sister     Migraines Sister        Social History:  Marital Status:  Single [1]  Social History     Tobacco Use    Smoking status: Former     Types: Other    Smokeless tobacco: Never   Vaping Use    Vaping Use: Some days    Substances: Nicotine    Devices: [employer] tank   Substance Use Topics    Alcohol use: Yes     Comment: occasional    Drug use: Never        Medications:    chlorhexidine (PERIDEX) 0.12 % solution  clindamycin (CLEOCIN) 150 MG capsule  ibuprofen (ADVIL/MOTRIN) 800 MG tablet  EPINEPHrine (ANY BX GENERIC EQUIV) 0.3 MG/0.3ML injection 2-pack  EPINEPHrine (ANY BX GENERIC EQUIV) 0.3 MG/0.3ML injection 2-pack          Review of Systems   Constitutional:  Negative for activity change, appetite change, chills and fever.   HENT:  Positive for dental problem and  ear pain (left sided). Negative for sore throat and trouble swallowing.    Eyes:  Negative for discharge and redness.   Respiratory:  Negative for cough.    Gastrointestinal:  Positive for nausea. Negative for abdominal pain and diarrhea.   Skin:  Negative for color change, pallor, rash and wound.   Neurological:  Negative for dizziness, light-headedness and headaches.   All other systems reviewed and are negative.      Physical Exam   BP: 125/83  Pulse: 109  Temp: 98.6  F (37  C)  Resp: 16  Weight: 87.7 kg (193 lb 5.5 oz)  SpO2: 96 %      Physical Exam  Vitals and nursing note reviewed.   Constitutional:       General: He is not in acute distress.     Appearance: Normal appearance. He is not ill-appearing, toxic-appearing or diaphoretic.   HENT:      Head:      Jaw: Tenderness, swelling and pain on movement present.      Comments: He is able to open mouth 4.5cm. no swallowing difficulty. There is swelling to left jaw with palpable fluctuance to left cheek and lower gums. No visible abscess to drain.      Right Ear: Tympanic membrane, ear canal and external ear normal. There is no impacted cerumen.      Left Ear: Tympanic membrane, ear canal and external ear normal. There is no impacted cerumen.      Nose: No congestion or rhinorrhea.      Mouth/Throat:      Mouth: Mucous membranes are moist.      Dentition: Dental tenderness, gingival swelling, dental caries and dental abscesses present. No gum lesions.      Pharynx: Oropharynx is clear. Uvula midline. No pharyngeal swelling, oropharyngeal exudate, posterior oropharyngeal erythema or uvula swelling.     Eyes:      General:         Right eye: No discharge.         Left eye: No discharge.      Extraocular Movements: Extraocular movements intact.      Pupils: Pupils are equal, round, and reactive to light.   Cardiovascular:      Rate and Rhythm: Normal rate and regular rhythm.      Pulses: Normal pulses.      Heart sounds: Normal heart sounds. No murmur  heard.  Pulmonary:      Effort: Pulmonary effort is normal. No respiratory distress.      Breath sounds: Normal breath sounds. No stridor. No wheezing, rhonchi or rales.   Neurological:      Mental Status: He is alert.         ED Course                 Procedures             No results found for this or any previous visit (from the past 24 hour(s)).    Medications - No data to display    Assessments & Plan (with Medical Decision Making)     I have reviewed the nursing notes.    I have reviewed the findings, diagnosis, plan and need for follow up with the patient.  Javier Martinez is a 28 year old male who presents to the urgent care with a 3 day history of left lower dental pain. Today, he woke up with swelling to left lower jaw. He denies fevers, chills, abd pain, chest pain, shortness of breath, vomiting, diarrhea, and swallowing difficulty. He has been taking 400mg of tylenol and 800mg of ibuprofen with some reduction in pain. No recent abx. Has not seen dentist, will call tomorrow.     MDM: VSS and afebrile. Initial HR in triage elevated at 109. No tachycardia on auscultation. Lungs clear, heart tones regular. TM clear bilaterally. There is no erythema to posterior oropharynx. Tooth #17 is visibly cracked and decaying. There is tenderness into lower gums with fluctuance noted to left lower cheek. Mild facial swelling also present. No erythema to face. He is able to open mouth 4.5cm. no swallowing difficulty. There is swelling to left jaw with palpable fluctuance to left cheek and lower gums. No visible abscess on gums to drain. He is non toxic in appearance. No noted distress.  He has an allergy to amoxicillin. Will prescribe clindamycin, he will  and start tonight. Peridex swish and spit also prescribed. Stressed returning with any decreased opening of mouth, fevers, vomiting, increased swelling, or other concerns. He is in agreement with plan. Will call dentist in the AM.     (K04.7) Dental  abscess  Plan: You can take 650-1000mg of tylenol every 6 hours, max of 4000mg in 24 hours and 600-800mg of ibuprofen every 8 hours, max of 2400mg in 24 hours.     Antibiotics 3 times daily for 7 days. Yogurt or probiotic while taking.     Call dentist tomorrow.     Return with any increased pain, inability to open mouth, swallowing difficulty, or other concerns. Understanding verbalized.       Current Discharge Medication List        START taking these medications    Details   chlorhexidine (PERIDEX) 0.12 % solution Swish and spit 15 mLs in mouth 2 times daily  Qty: 473 mL, Refills: 0      clindamycin (CLEOCIN) 150 MG capsule Take 3 capsules (450 mg) by mouth 3 times daily  Qty: 63 capsule, Refills: 0             Final diagnoses:   Dental abscess       9/17/2023   [facility]       Melissa Pablo NP  09/17/23 [id number]

## 2023-09-17 NOTE — DISCHARGE INSTRUCTIONS
You can take 650-1000mg of tylenol every 6 hours, max of 4000mg in 24 hours and 600-800mg of ibuprofen every 8 hours, max of 2400mg in 24 hours.     Antibiotics 3 times daily for 7 days. Yogurt or probiotic while taking.     Call dentist tomorrow.     Return with any increased pain, inability to open mouth, swallowing difficulty, or other concerns

## 2023-09-19 ENCOUNTER — OFFICE VISIT (OUTPATIENT)
Dept: FAMILY MEDICINE | Facility: OTHER | Age: 28
End: 2023-09-19
Attending: NURSE PRACTITIONER
Payer: COMMERCIAL

## 2023-09-19 VITALS
OXYGEN SATURATION: 97 % | TEMPERATURE: 97.7 F | RESPIRATION RATE: 16 BRPM | SYSTOLIC BLOOD PRESSURE: 110 MMHG | BODY MASS INDEX: 25.8 KG/M2 | DIASTOLIC BLOOD PRESSURE: 76 MMHG | HEIGHT: 73 IN | WEIGHT: 194.7 LBS | HEART RATE: 90 BPM

## 2023-09-19 DIAGNOSIS — M62.89 MUSCLE STIFFNESS: ICD-10-CM

## 2023-09-19 DIAGNOSIS — K04.7 DENTAL ABSCESS: Primary | ICD-10-CM

## 2023-09-19 LAB
BASOPHILS # BLD AUTO: 0 10E3/UL (ref 0–0.2)
BASOPHILS NFR BLD AUTO: 0 %
CRP SERPL-MCNC: 154.39 MG/L
EOSINOPHIL # BLD AUTO: 0.1 10E3/UL (ref 0–0.7)
EOSINOPHIL NFR BLD AUTO: 1 %
ERYTHROCYTE [DISTWIDTH] IN BLOOD BY AUTOMATED COUNT: 12.4 % (ref 10–15)
HCT VFR BLD AUTO: 46.5 % (ref 40–53)
HGB BLD-MCNC: 15.8 G/DL (ref 13.3–17.7)
IMM GRANULOCYTES # BLD: 0 10E3/UL
IMM GRANULOCYTES NFR BLD: 0 %
LYMPHOCYTES # BLD AUTO: 1 10E3/UL (ref 0.8–5.3)
LYMPHOCYTES NFR BLD AUTO: 11 %
MCH RBC QN AUTO: 31.3 PG (ref 26.5–33)
MCHC RBC AUTO-ENTMCNC: 34 G/DL (ref 31.5–36.5)
MCV RBC AUTO: 92 FL (ref 78–100)
MONOCYTES # BLD AUTO: 0.8 10E3/UL (ref 0–1.3)
MONOCYTES NFR BLD AUTO: 8 %
NEUTROPHILS # BLD AUTO: 7.3 10E3/UL (ref 1.6–8.3)
NEUTROPHILS NFR BLD AUTO: 79 %
PLATELET # BLD AUTO: 212 10E3/UL (ref 150–450)
RBC # BLD AUTO: 5.05 10E6/UL (ref 4.4–5.9)
WBC # BLD AUTO: 9.2 10E3/UL (ref 4–11)

## 2023-09-19 PROCEDURE — 85025 COMPLETE CBC W/AUTO DIFF WBC: CPT | Performed by: NURSE PRACTITIONER

## 2023-09-19 PROCEDURE — 36415 COLL VENOUS BLD VENIPUNCTURE: CPT | Performed by: NURSE PRACTITIONER

## 2023-09-19 PROCEDURE — 86140 C-REACTIVE PROTEIN: CPT | Performed by: NURSE PRACTITIONER

## 2023-09-19 PROCEDURE — 99214 OFFICE O/P EST MOD 30 MIN: CPT | Performed by: NURSE PRACTITIONER

## 2023-09-19 RX ORDER — TIZANIDINE 2 MG/1
2-4 TABLET ORAL 3 TIMES DAILY
Qty: 30 TABLET | Refills: 0 | Status: SHIPPED | OUTPATIENT
Start: 2023-09-19

## 2023-09-19 RX ORDER — IBUPROFEN 800 MG/1
800 TABLET, FILM COATED ORAL EVERY 8 HOURS PRN
Qty: 30 TABLET | Refills: 0 | Status: SHIPPED | OUTPATIENT
Start: 2023-09-19

## 2023-09-19 ASSESSMENT — PAIN SCALES - GENERAL: PAINLEVEL: SEVERE PAIN (7)

## 2023-09-19 NOTE — PATIENT INSTRUCTIONS
Continue with scheduled dosing ibuprofen every 8 hours and acetaminophen 1000 mg every 8 hours  Continue with clindamycin as previously ordered  Start the previously prescribed mouth rinse  Start tizanidine. This is a muscle relaxer for your jaw. Avoid driving until you know how it affects you.   If you are not improving by Thursday AM follow up

## 2023-09-19 NOTE — LETTER
September 19, 2023      Javier Martinez  [address] [facility] [address]        To Whom It May Concern:    Javier Martinez  was seen on 9/19/23.  Please excuse him 9/19/23 -9/21/23 due to illness.This is a tentative timeline. If feeling well enough to return to work sooner, okay to do so without restrictions.         Sincerely,        Lianne Castro, CNP

## 2023-09-19 NOTE — PROGRESS NOTES
"  Assessment & Plan     Dental abscess- Left lower.   Continue with scheduled dosing ibuprofen every 8 hours and acetaminophen 1000 mg every 8 hours  Continue with clindamycin as previously ordered  Start the previously prescribed mouth rinse  Start tizanidine. This is a muscle relaxer for your jaw. Avoid driving until you know how it affects you.   If you are not improving by Thursday AM follow up   - CBC with platelets and differential; Future  - CRP, inflammation; Future  - ibuprofen (ADVIL/MOTRIN) 800 MG tablet; Take 1 tablet (800 mg) by mouth every 8 hours as needed for moderate pain    Muscle stiffness  - tiZANidine (ZANAFLEX) 2 MG tablet; Take 1-2 tablets (2-4 mg) by mouth 3 times daily    Ordering of each unique test  Prescription drug management  I spent a total of 25 minutes on the day of the visit.   Time spent by me doing chart review, history and exam, documentation and further activities per the note     MED REC REQUIRED  Post Medication Reconciliation Status: patient was not discharged from an inpatient facility or TCU  BMI:   Estimated body mass index is 25.69 kg/m  as calculated from the following:    Height as of this encounter: 1.854 m (6' 1\").    Weight as of this encounter: 88.3 kg (194 lb 11.2 oz).           Return if symptoms worsen or fail to improve.    Lianne Castro, CNP  [facility] - MOJGAN Herrera is a 28 year old, presenting for the following health issues:  ER F/U        9/19/2023     7:47 AM   Additional Questions   Roomed by mao   Accompanied by none       HPI     ED/UC Followup:    Facility:  [facility] ED - [place]   Date of visit: 9/17/23   Reason for visit: Dental Abscess  Current Status: unsure if it has gotten any better has dental appointment 9/26/23 in [place]    Unknown allergy reaction to amoxicillin.  However, dose have  other anaphylaxis allergies (Bee and Hornets) and with the limited opening of his jaw I am not comfortable doing a " "trial of rocephin at this time in case there were a reaction affecting his airway.     Due for tdap/td. Declined.        Review of Systems   Constitutional, HEENT, cardiovascular, pulmonary, gi and gu systems are negative, except as otherwise noted.      Objective    /76 (BP Location: Right arm, Patient Position: Chair, Cuff Size: Adult Regular)   Pulse 90   Temp 97.7  F (36.5  C) (Tympanic)   Resp 16   Ht 1.854 m (6' 1\")   Wt 88.3 kg (194 lb 11.2 oz)   SpO2 97%   BMI 25.69 kg/m    Body mass index is 25.69 kg/m .      Physical Exam   GENERAL: healthy, alert and no distress  HENT: normal cephalic/atraumatic, ear canals and TM's normal, oropharynx clear, oral mucous membranes moist. Swelling of left cheek and submandibular adenopathy. Tenderness of this area. No erythema. No visible drainage or abscess to drain. Left lower molars with poor dentition and visible cavities. Unable to open jaw more than about 1.5 cm when measured at front teeth.   NECK: no adenopathy, no asymmetry, masses, or scars and thyroid normal to palpation  PSYCH: mentation appears normal, affect normal/bright    Labs and/or imaging are pending at the end of this clinic visit. Please see communication attached to labs and/or imaging results.                 "

## 2023-09-20 ENCOUNTER — APPOINTMENT (OUTPATIENT)
Dept: CT IMAGING | Facility: HOSPITAL | Age: 28
End: 2023-09-20
Attending: NURSE PRACTITIONER
Payer: COMMERCIAL

## 2023-09-20 ENCOUNTER — HOSPITAL ENCOUNTER (EMERGENCY)
Facility: HOSPITAL | Age: 28
Discharge: HOME | End: 2023-09-20
Attending: NURSE PRACTITIONER | Admitting: NURSE PRACTITIONER
Payer: COMMERCIAL

## 2023-09-20 VITALS
RESPIRATION RATE: 18 BRPM | DIASTOLIC BLOOD PRESSURE: 99 MMHG | HEART RATE: 102 BPM | SYSTOLIC BLOOD PRESSURE: 141 MMHG | TEMPERATURE: 99.7 F | OXYGEN SATURATION: 97 %

## 2023-09-20 DIAGNOSIS — K04.7 DENTAL ABSCESS: ICD-10-CM

## 2023-09-20 LAB
ANION GAP SERPL CALCULATED.3IONS-SCNC: 15 MMOL/L (ref 7–15)
BUN SERPL-MCNC: 8.6 MG/DL (ref 6–20)
CALCIUM SERPL-MCNC: 9.5 MG/DL (ref 8.6–10)
CHLORIDE SERPL-SCNC: 100 MMOL/L (ref 98–107)
CREAT SERPL-MCNC: 0.92 MG/DL (ref 0.67–1.17)
DEPRECATED HCO3 PLAS-SCNC: 25 MMOL/L (ref 22–29)
EGFRCR SERPLBLD CKD-EPI 2021: >90 ML/MIN/1.73M2
GLUCOSE SERPL-MCNC: 106 MG/DL (ref 70–99)
POTASSIUM SERPL-SCNC: 4 MMOL/L (ref 3.4–5.3)
SODIUM SERPL-SCNC: 140 MMOL/L (ref 136–145)

## 2023-09-20 PROCEDURE — 99213 OFFICE O/P EST LOW 20 MIN: CPT | Performed by: NURSE PRACTITIONER

## 2023-09-20 PROCEDURE — G0463 HOSPITAL OUTPT CLINIC VISIT: HCPCS | Mod: 25

## 2023-09-20 PROCEDURE — 80048 BASIC METABOLIC PNL TOTAL CA: CPT | Performed by: NURSE PRACTITIONER

## 2023-09-20 PROCEDURE — 250N000009 HC RX 250: Performed by: NURSE PRACTITIONER

## 2023-09-20 PROCEDURE — 36415 COLL VENOUS BLD VENIPUNCTURE: CPT | Performed by: NURSE PRACTITIONER

## 2023-09-20 PROCEDURE — 250N000011 HC RX IP 250 OP 636: Mod: JZ | Performed by: NURSE PRACTITIONER

## 2023-09-20 PROCEDURE — 250N000011 HC RX IP 250 OP 636: Performed by: NURSE PRACTITIONER

## 2023-09-20 PROCEDURE — 70491 CT SOFT TISSUE NECK W/DYE: CPT

## 2023-09-20 RX ORDER — DEXAMETHASONE SODIUM PHOSPHATE 10 MG/ML
10 INJECTION INTRAMUSCULAR; INTRAVENOUS ONCE
Status: COMPLETED | OUTPATIENT
Start: 2023-09-20 | End: 2023-09-20

## 2023-09-20 RX ORDER — METRONIDAZOLE 500 MG/1
500 TABLET ORAL EVERY 8 HOURS
Qty: 20 TABLET | Refills: 0 | Status: SHIPPED | OUTPATIENT
Start: 2023-09-20 | End: 2023-09-27

## 2023-09-20 RX ORDER — METRONIDAZOLE 500 MG/100ML
500 INJECTION, SOLUTION INTRAVENOUS ONCE
Status: COMPLETED | OUTPATIENT
Start: 2023-09-20 | End: 2023-09-20

## 2023-09-20 RX ORDER — CLINDAMYCIN PHOSPHATE 150 MG/ML
600 INJECTION, SOLUTION INTRAVENOUS ONCE
Status: DISCONTINUED | OUTPATIENT
Start: 2023-09-20 | End: 2023-09-20

## 2023-09-20 RX ORDER — IOPAMIDOL 755 MG/ML
75 INJECTION, SOLUTION INTRAVASCULAR ONCE
Status: COMPLETED | OUTPATIENT
Start: 2023-09-20 | End: 2023-09-20

## 2023-09-20 RX ORDER — CLINDAMYCIN PHOSPHATE 600 MG/50ML
600 INJECTION, SOLUTION INTRAVENOUS ONCE
Status: COMPLETED | OUTPATIENT
Start: 2023-09-20 | End: 2023-09-20

## 2023-09-20 RX ORDER — KETOROLAC TROMETHAMINE 15 MG/ML
15 INJECTION, SOLUTION INTRAMUSCULAR; INTRAVENOUS ONCE
Status: COMPLETED | OUTPATIENT
Start: 2023-09-20 | End: 2023-09-20

## 2023-09-20 RX ADMIN — DEXAMETHASONE SODIUM PHOSPHATE 10 MG: 10 INJECTION INTRAMUSCULAR; INTRAVENOUS at 18:07

## 2023-09-20 RX ADMIN — METRONIDAZOLE 500 MG: 500 INJECTION, SOLUTION INTRAVENOUS at 17:53

## 2023-09-20 RX ADMIN — IOPAMIDOL 75 ML: 755 INJECTION, SOLUTION INTRAVENOUS at 17:10

## 2023-09-20 RX ADMIN — CLINDAMYCIN IN 5 PERCENT DEXTROSE 600 MG: 12 INJECTION, SOLUTION INTRAVENOUS at 16:33

## 2023-09-20 RX ADMIN — KETOROLAC TROMETHAMINE 15 MG: 15 INJECTION INTRAMUSCULAR; INTRAVENOUS at 17:51

## 2023-09-20 ASSESSMENT — ACTIVITIES OF DAILY LIVING (ADL)
ADLS_ACUITY_SCORE: 35
ADLS_ACUITY_SCORE: 35

## 2023-09-20 ASSESSMENT — ENCOUNTER SYMPTOMS
NECK STIFFNESS: 0
SHORTNESS OF BREATH: 0
FACIAL SWELLING: 1
DIZZINESS: 0
ACTIVITY CHANGE: 1
LIGHT-HEADEDNESS: 0
NECK PAIN: 1
SORE THROAT: 0
TROUBLE SWALLOWING: 0
SINUS PRESSURE: 0
VOMITING: 0
EYES NEGATIVE: 1
HEADACHES: 0
SINUS PAIN: 0
COUGH: 0
FEVER: 1
NAUSEA: 0

## 2023-09-20 NOTE — ED PROVIDER NOTES
History     Chief Complaint   Patient presents with    Dental Pain     HPI  Javier Martinez is a 28 year old male who is accompanied per his dad.  He presents with a 4-day history of left-sided facial swelling and dental infection.  Accompanied with low-grade fever noted in triage and neck pain.  Was evaluated 3 days ago and started on clindamycin.  Was seen yesterday by primary care provider and given tizanidine to relax his facial muscles.  Was seen per a dentist today and sent to ER/urgent care for trismus and IV antibiotics.  Took 800 mg of ibuprofen this morning with decrease in pain.  Vapes.  Denies nausea, vomiting, headaches, dizziness, lightheadedness.      Allergies:  Allergies   Allergen Reactions    Bee Venom Anaphylaxis    Hornets Anaphylaxis    Amoxicillin     Amoxicillin-Pot Clavulanate        Problem List:    Patient Active Problem List    Diagnosis Date Noted    Allergic rhinitis due to other allergen 04/08/2008     Priority: Medium        Past Medical History:    History reviewed. No pertinent past medical history.    Past Surgical History:    Past Surgical History:   Procedure Laterality Date    MYRINGOTOMY, INSERT TUBE BILATERAL, COMBINED         Family History:    Family History   Problem Relation Age of Onset    Eczema Mother     Migraines Mother     Eczema Father     Migraines Father     Eczema Sister     Migraines Sister        Social History:  Marital Status:  Single [1]  Social History     Tobacco Use    Smoking status: Former     Types: Other    Smokeless tobacco: Never   Vaping Use    Vaping Use: Former    Substances: Nicotine    Devices: [employer] tank   Substance Use Topics    Alcohol use: Yes     Comment: occasional    Drug use: Never        Medications:    chlorhexidine (PERIDEX) 0.12 % solution  clindamycin (CLEOCIN) 150 MG capsule  ibuprofen (ADVIL/MOTRIN) 800 MG tablet  tiZANidine (ZANAFLEX) 2 MG tablet  EPINEPHrine (ANY BX GENERIC EQUIV) 0.3 MG/0.3ML injection  2-pack  EPINEPHrine (ANY BX GENERIC EQUIV) 0.3 MG/0.3ML injection 2-pack  ibuprofen (ADVIL/MOTRIN) 800 MG tablet          Review of Systems   Constitutional:  Positive for activity change and fever (low grade noted in triage).   HENT:  Positive for facial swelling (left sided). Negative for ear pain, sinus pressure, sinus pain, sore throat and trouble swallowing.    Eyes: Negative.    Respiratory:  Negative for cough and shortness of breath.    Gastrointestinal:  Negative for nausea and vomiting.   Musculoskeletal:  Positive for neck pain. Negative for neck stiffness.   Neurological:  Negative for dizziness, light-headedness and headaches.       Physical Exam   BP: 141/99  Pulse: 102  Temp: 99.7  F (37.6  C)  Resp: 18  SpO2: 97 %      Physical Exam  Vitals and nursing note reviewed.   Constitutional:       General: He is not in acute distress.     Appearance: He is normal weight.   HENT:      Head: Normocephalic.      Jaw: Trismus, tenderness and pain on movement present.        Right Ear: Tympanic membrane and ear canal normal.      Left Ear: Tympanic membrane and ear canal normal.      Nose:      Left Turbinates: Swollen.      Mouth/Throat:      Lips: Pink.      Mouth: Mucous membranes are moist.      Dentition: Abnormal dentition. Dental tenderness, gingival swelling and dental caries present.     Cardiovascular:      Rate and Rhythm: Normal rate.   Pulmonary:      Effort: Pulmonary effort is normal.   Musculoskeletal:      Cervical back: Tenderness present.   Lymphadenopathy:      Cervical: Cervical adenopathy present.   Skin:     General: Skin is warm and dry.   Neurological:      Mental Status: He is alert and oriented to person, place, and time.   Psychiatric:         Behavior: Behavior normal.         ED Course                 Procedures             Results for orders placed or performed during the hospital encounter of 09/20/23 (from the past 24 hour(s))   Basic metabolic panel   Result Value Ref Range     Sodium 140 136 - 145 mmol/L    Potassium 4.0 3.4 - 5.3 mmol/L    Chloride 100 98 - 107 mmol/L    Carbon Dioxide (CO2) 25 22 - 29 mmol/L    Anion Gap 15 7 - 15 mmol/L    Urea Nitrogen 8.6 6.0 - 20.0 mg/dL    Creatinine 0.92 0.67 - 1.17 mg/dL    Calcium 9.5 8.6 - 10.0 mg/dL    Glucose 106 (H) 70 - 99 mg/dL    GFR Estimate >90 >60 mL/min/1.73m2   Soft tissue neck CT w contrast    Narrative    CT SOFT TISSUE NECK W CONTRAST    HISTORY: 28 yearsMale left side dental infection with severe facial  swelling    TECHNIQUE: Axial CT imaging of the neck was performed with intervenous  contrast. Coronal and sagittal reconstructions were obtained.  This exam was performed using one or more of the following dose  reduction techniques:  Automated exposure control, adjustment of the DIMPLE and/or KV according  to patient's size, and/or use of iterative reconstruction technique.    COMPARISON: None    FINDINGS: There is extensive subcutaneous edema of the left maxillary  region. There is enlargement of the left masseter. There is no  evidence of an organized fluid collection at this time. There are  enlarged left-sided submandibular lymph nodes and left anterior chain  cervical lymph nodes measuring up to 13 mm.    There is a large caries/erosion of 2 #18 with lucency around the root.      Impression    IMPRESSION: Dental abscess associated with tooth #18. There is  associate soft tissue edema and left-sided reactive lymphadenopathy  and no organized fluid collections are seen at this time to suggest a  soft tissue abscess.    REESE NORMAN MD         SYSTEM ID:  [id number]       Medications   metroNIDAZOLE (FLAGYL) infusion 500 mg (500 mg Intravenous $New Bag 9/20/23 1753)   clindamycin (CLEOCIN) 600 mg in 50 mL D5W intermittent infusion (0 mg Intravenous Stopped 9/20/23 1730)   iopamidol (ISOVUE-370) solution 75 mL (75 mLs Intravenous $Given 9/20/23 1710)   sodium chloride (PF) 0.9% PF flush 60 mL (50 mLs Intravenous $Given 9/20/23  1702)   ketorolac (TORADOL) injection 15 mg (15 mg Intravenous $Given 9/20/23 1757)   dexAMETHasone (DECADRON) injectable solution used ORALLY 10 mg (10 mg Oral $Given 9/20/23 1807)       Assessments & Plan (with Medical Decision Making)     I have reviewed the nursing notes.    I have reviewed the findings, diagnosis, plan and need for follow up with the patient.  (K04.7) Dental abscess  Comment: 28 year old male who is accompanied per his dad.  He presents with a 4-day history of left-sided facial swelling and dental infection.  Accompanied with low-grade fever noted in triage and neck pain.  Was evaluated 3 days ago and started on clindamycin.  Was seen yesterday by primary care provider and given tizanidine to relax his facial muscles.  Was seen per a dentist today and sent to ER/urgent care for trismus and IV antibiotics.  Took 800 mg of ibuprofen this morning with decrease in pain.  Vapes.  Denies nausea, vomiting, headaches, dizziness, lightheadedness.      MDM: moderate  to severe. lower left sided facial swelling.   Tooth # 17 impacted. Moderate gingival swelling. Trismus.Unable to open mouth for further evaluation.    BMP negative    Clindamycin 600 mg and metronidazole 500 mg given IV  Ketorolac 15 mg given iv  Dexamethasone 10 mg given orally    Soft tissue CT neck per radiology:   Dental abscess associated with tooth #18. There is associate soft tissue edema and left-sided reactive lymphadenopathy and no organized fluid collections are seen at this time to suggest a  soft tissue abscess.    Is talking without difficulty     Plan: continue clindamycin. Add metronidazole. Education provided and/or discussed for this/these medications and abscess tooth.   May restart clindamycin at midnight    After 10 pm my start ibuprofen: Ibuprofen 600 to 800 mg (3 - 4 tabs of over the counter med) every six to eight hours as needed;not to exceed maximum amount of 3200 mg in 24 hours. Take with food. Tylenol 650 to 1000  "mg every four to six hours as needed (not to exceed more than 4000 mg in a 24 hour period). May use interchangeably. Suggest medicating around the clock for the next 24-48 hours.     Continue clindamycin as prescribed for infection.  Add metronidazole every 8 hours for 7 days  -Increase fluids.   -Complete all antibiotics even if feeling better.    -Taking antibiotics with food may decrease the symptoms, of an upset stomach, that can occur when taking antibiotics. Antibiotics frequently cause diarrhea. Probiotics or yogurt may help prevent or decrease these symptoms.   -Return to be reevaluated if symptoms do not improve, or worsen.    Peridex swish and spit twice daily. Do not use longer than ten days as it may stain your teeth purple.    Apply a cold pack or ice compress for up to 20 minutes several times a day. This is to help reduce pain and relieve swelling. Cover it with a thin, dry cloth before putting it on your skin.    Rinse your mouth with warm saltwater several times per day. This will help reduce irritation, gum swelling, and pain.  Good oral hygiene is imperitive. Brush your at least twice a day. Use a fluoride toothpaste and soft-bristle toothbrush.  Eat a healthy diet that doesn t include many sugary foods and drinks.  Call ASAP to schedule an appointment to see a dentist.   Our  department can try to assist you if you are having difficulty finding a dentist, dental coverage, or paying for dental care.  You can reach them by calling [phone] and asking for .  Return to ED/UC if symptoms increase or concerns develop such as those discussed and listed on the \"When to go the Emergency Room\" portion of your discharge instructions.   These discharge instructions and medications were reviewed with him and understanding verbalized.    This document was prepared using a combination of typing and voice generated software.  While every attempt was made for accuracy, spelling and " grammatical errors may exist.    Current Discharge Medication List          Final diagnoses:   Dental abscess       9/20/2023   [facility]         Anne Torres, JUANITA  09/20/23 [id number]

## 2023-09-20 NOTE — DISCHARGE INSTRUCTIONS
"May restart clindamycin at midnight    After 10 pm my start ibuprofen: Ibuprofen 600 to 800 mg (3 - 4 tabs of over the counter med) every six to eight hours as needed;not to exceed maximum amount of 3200 mg in 24 hours. Take with food. Tylenol 650 to 1000 mg every four to six hours as needed (not to exceed more than 4000 mg in a 24 hour period). May use interchangeably. Suggest medicating around the clock for the next 24-48 hours.     Continue clindamycin as prescribed for infection.  Add metronidazole every 8 hours for 7 days  -Increase fluids.   -Complete all antibiotics even if feeling better.    -Taking antibiotics with food may decrease the symptoms, of an upset stomach, that can occur when taking antibiotics. Antibiotics frequently cause diarrhea. Probiotics or yogurt may help prevent or decrease these symptoms.   -Return to be reevaluated if symptoms do not improve, or worsen.    Peridex swish and spit twice daily. Do not use longer than ten days as it may stain your teeth purple.    Apply a cold pack or ice compress for up to 20 minutes several times a day. This is to help reduce pain and relieve swelling. Cover it with a thin, dry cloth before putting it on your skin.    Rinse your mouth with warm saltwater several times per day. This will help reduce irritation, gum swelling, and pain.  Good oral hygiene is imperitive. Brush your at least twice a day. Use a fluoride toothpaste and soft-bristle toothbrush.  Eat a healthy diet that doesn t include many sugary foods and drinks.  Call ASAP to schedule an appointment to see a dentist.   Our  department can try to assist you if you are having difficulty finding a dentist, dental coverage, or paying for dental care.  You can reach them by calling [phone] and asking for .  Return to ED/UC if symptoms increase or concerns develop such as those discussed and listed on the \"When to go the Emergency Room\" portion of your discharge " instructions.

## 2023-09-20 NOTE — ED TRIAGE NOTES
Pt presents with c/o dental pain  Was in on Sunday, with swelling noted to his right cheek  Now has more swelling and unable to open his jaw.  This am pain was 10/10  Ibu around 10 am

## 2023-09-20 NOTE — Clinical Note
Javier Martinez was seen and treated in our emergency department on 9/20/2023.  He may return to work on 09/23/2023.  Evaluated in Urgent Care     If you have any questions or concerns, please don't hesitate to call.      Anne Torres, CNP 12-Apr-2023

## 2023-09-22 ENCOUNTER — HOSPITAL ENCOUNTER (EMERGENCY)
Facility: HOSPITAL | Age: 28
Discharge: HOME | End: 2023-09-22
Attending: NURSE PRACTITIONER | Admitting: NURSE PRACTITIONER
Payer: COMMERCIAL

## 2023-09-22 VITALS
SYSTOLIC BLOOD PRESSURE: 133 MMHG | OXYGEN SATURATION: 98 % | TEMPERATURE: 99.2 F | RESPIRATION RATE: 18 BRPM | DIASTOLIC BLOOD PRESSURE: 85 MMHG | HEART RATE: 84 BPM

## 2023-09-22 DIAGNOSIS — R25.2 TRISMUS: ICD-10-CM

## 2023-09-22 DIAGNOSIS — M27.2 ODONTOGENIC INFECTION OF JAW: ICD-10-CM

## 2023-09-22 LAB
ANION GAP SERPL CALCULATED.3IONS-SCNC: 10 MMOL/L (ref 7–15)
BASOPHILS # BLD AUTO: 0 10E3/UL (ref 0–0.2)
BASOPHILS NFR BLD AUTO: 0 %
BUN SERPL-MCNC: 15.7 MG/DL (ref 6–20)
CALCIUM SERPL-MCNC: 9.5 MG/DL (ref 8.6–10)
CHLORIDE SERPL-SCNC: 102 MMOL/L (ref 98–107)
CREAT SERPL-MCNC: 0.97 MG/DL (ref 0.67–1.17)
DEPRECATED HCO3 PLAS-SCNC: 27 MMOL/L (ref 22–29)
EGFRCR SERPLBLD CKD-EPI 2021: >90 ML/MIN/1.73M2
EOSINOPHIL # BLD AUTO: 0 10E3/UL (ref 0–0.7)
EOSINOPHIL NFR BLD AUTO: 0 %
ERYTHROCYTE [DISTWIDTH] IN BLOOD BY AUTOMATED COUNT: 12.7 % (ref 10–15)
GLUCOSE SERPL-MCNC: 106 MG/DL (ref 70–99)
HCT VFR BLD AUTO: 42.5 % (ref 40–53)
HGB BLD-MCNC: 14.4 G/DL (ref 13.3–17.7)
HOLD SPECIMEN: NORMAL
IMM GRANULOCYTES # BLD: 0 10E3/UL
IMM GRANULOCYTES NFR BLD: 0 %
LYMPHOCYTES # BLD AUTO: 1.7 10E3/UL (ref 0.8–5.3)
LYMPHOCYTES NFR BLD AUTO: 15 %
MCH RBC QN AUTO: 31.2 PG (ref 26.5–33)
MCHC RBC AUTO-ENTMCNC: 33.9 G/DL (ref 31.5–36.5)
MCV RBC AUTO: 92 FL (ref 78–100)
MONOCYTES # BLD AUTO: 1.1 10E3/UL (ref 0–1.3)
MONOCYTES NFR BLD AUTO: 10 %
NEUTROPHILS # BLD AUTO: 7.9 10E3/UL (ref 1.6–8.3)
NEUTROPHILS NFR BLD AUTO: 75 %
NRBC # BLD AUTO: 0 10E3/UL
NRBC BLD AUTO-RTO: 0 /100
PLATELET # BLD AUTO: 246 10E3/UL (ref 150–450)
POTASSIUM SERPL-SCNC: 3.7 MMOL/L (ref 3.4–5.3)
RBC # BLD AUTO: 4.62 10E6/UL (ref 4.4–5.9)
SODIUM SERPL-SCNC: 139 MMOL/L (ref 136–145)
WBC # BLD AUTO: 10.8 10E3/UL (ref 4–11)

## 2023-09-22 PROCEDURE — 36415 COLL VENOUS BLD VENIPUNCTURE: CPT | Performed by: NURSE PRACTITIONER

## 2023-09-22 PROCEDURE — 99284 EMERGENCY DEPT VISIT MOD MDM: CPT | Performed by: NURSE PRACTITIONER

## 2023-09-22 PROCEDURE — 96367 TX/PROPH/DG ADDL SEQ IV INF: CPT

## 2023-09-22 PROCEDURE — 99284 EMERGENCY DEPT VISIT MOD MDM: CPT | Mod: 25

## 2023-09-22 PROCEDURE — 250N000011 HC RX IP 250 OP 636: Mod: JZ | Performed by: NURSE PRACTITIONER

## 2023-09-22 PROCEDURE — 96365 THER/PROPH/DIAG IV INF INIT: CPT

## 2023-09-22 PROCEDURE — 80048 BASIC METABOLIC PNL TOTAL CA: CPT | Performed by: NURSE PRACTITIONER

## 2023-09-22 PROCEDURE — 85025 COMPLETE CBC W/AUTO DIFF WBC: CPT | Performed by: NURSE PRACTITIONER

## 2023-09-22 RX ORDER — METRONIDAZOLE 500 MG/100ML
500 INJECTION, SOLUTION INTRAVENOUS ONCE
Status: COMPLETED | OUTPATIENT
Start: 2023-09-22 | End: 2023-09-22

## 2023-09-22 RX ORDER — CLINDAMYCIN PHOSPHATE 600 MG/50ML
600 INJECTION, SOLUTION INTRAVENOUS ONCE
Status: COMPLETED | OUTPATIENT
Start: 2023-09-22 | End: 2023-09-22

## 2023-09-22 RX ADMIN — METRONIDAZOLE 500 MG: 500 INJECTION, SOLUTION INTRAVENOUS at 15:27

## 2023-09-22 RX ADMIN — CLINDAMYCIN IN 5 PERCENT DEXTROSE 600 MG: 12 INJECTION, SOLUTION INTRAVENOUS at 16:33

## 2023-09-22 ASSESSMENT — ENCOUNTER SYMPTOMS
MUSCULOSKELETAL NEGATIVE: 1
HEMATOLOGIC/LYMPHATIC NEGATIVE: 1
NEUROLOGICAL NEGATIVE: 1
TROUBLE SWALLOWING: 0
PSYCHIATRIC NEGATIVE: 1
GASTROINTESTINAL NEGATIVE: 1
RESPIRATORY NEGATIVE: 1
VOICE CHANGE: 0
EYES NEGATIVE: 1
ALLERGIC/IMMUNOLOGIC NEGATIVE: 1
ENDOCRINE NEGATIVE: 1
CONSTITUTIONAL NEGATIVE: 1
CARDIOVASCULAR NEGATIVE: 1
FACIAL SWELLING: 1
SORE THROAT: 0

## 2023-09-22 ASSESSMENT — ACTIVITIES OF DAILY LIVING (ADL)
ADLS_ACUITY_SCORE: 35

## 2023-09-22 NOTE — ED NOTES
Patient presents with complaints of facial swelling from an abscess tooth. States it's so bad he can't open his mouth. He was told per him from dentist that there's nothing to do until swelling is better controled. States he's looking to get IV antibiotics as that's what seemed to help with the swelling.

## 2023-09-22 NOTE — ED TRIAGE NOTES
Patient presents with dental abscess on lower left side of mouth. Patient was seen Sunday and Wednesday. Received IV ABX and was discharge home on oral. Patient report swelling is now back, and thinks he needs more IV ABX. Dentist will not touch tooth until swelling is down.

## 2023-09-22 NOTE — ED PROVIDER NOTES
History     Chief Complaint   Patient presents with    Dental Problem     HPI  Javier Martinez is a 28 year old individual comes in with left lower dental problem.  Patient states that the left lower tooth started hurting about 9/14/2023.  Went to urgent care on 9/17/23 and was prescribed clindamycin.  Patient was taking those and did not have improvement of symptoms.  Actually developed trismus so his dentist told him he needs to come in for IV antibiotics.  Patient was seen again in urgent care on 9/20/2023.  Lab work and CT was conducted showing dental abscess but no soft tissue abscess.  Patient was given clindamycin and metronidazole IV and discharged home to continue clindamycin and start metronidazole.    Patient states that the swelling near his left side of the nose has improved but everything else is the same.  Continues to have worsening trismus is and is only able to open mouth slightly.  For this reason patient comes back in.  Denies any fever or chills.  States is able to handle oral secretions at this time.  No wheezing or stridor reported.  No shortness of breath.  Pain in the left jaw is present.    Allergies:  Allergies   Allergen Reactions    Bee Venom Anaphylaxis    Hornets Anaphylaxis    Amoxicillin     Amoxicillin-Pot Clavulanate        Problem List:    Patient Active Problem List    Diagnosis Date Noted    Allergic rhinitis due to other allergen 04/08/2008     Priority: Medium        Past Medical History:    History reviewed. No pertinent past medical history.    Past Surgical History:    Past Surgical History:   Procedure Laterality Date    MYRINGOTOMY, INSERT TUBE BILATERAL, COMBINED         Family History:    Family History   Problem Relation Age of Onset    Eczema Mother     Migraines Mother     Eczema Father     Migraines Father     Eczema Sister     Migraines Sister        Social History:  Marital Status:  Single [1]  Social History     Tobacco Use    Smoking status: Former      Types: Vaping Device    Smokeless tobacco: Never   Vaping Use    Vaping Use: Former    Substances: Nicotine    Devices: Refillable tank   Substance Use Topics    Alcohol use: Yes     Comment: occasional    Drug use: Never        Medications:    chlorhexidine (PERIDEX) 0.12 % solution  clindamycin (CLEOCIN) 150 MG capsule  EPINEPHrine (ANY BX GENERIC EQUIV) 0.3 MG/0.3ML injection 2-pack  EPINEPHrine (ANY BX GENERIC EQUIV) 0.3 MG/0.3ML injection 2-pack  ibuprofen (ADVIL/MOTRIN) 800 MG tablet  ibuprofen (ADVIL/MOTRIN) 800 MG tablet  metroNIDAZOLE (FLAGYL) 500 MG tablet  tiZANidine (ZANAFLEX) 2 MG tablet          Review of Systems   Constitutional: Negative.    HENT:  Positive for dental problem (Left lower jaw dental problem with swelling), ear pain (Left ear) and facial swelling. Negative for hearing loss, sore throat, trouble swallowing and voice change.    Eyes: Negative.    Respiratory: Negative.     Cardiovascular: Negative.    Gastrointestinal: Negative.    Endocrine: Negative.    Genitourinary: Negative.    Musculoskeletal: Negative.    Skin: Negative.    Allergic/Immunologic: Negative.    Neurological: Negative.    Hematological: Negative.    Psychiatric/Behavioral: Negative.         Physical Exam   BP: 135/83  Pulse: 86  Temp: 98.5  F (36.9  C)  Resp: 18  SpO2: 98 %      GENERAL APPEARANCE:  The patient is a 28 year old well-developed, well-nourished individual that appears as stated age.  ET: Trismus is present and patient only able to open mouth 0.8 cm.  Tooth #17 does have crack present.  Palpation of left lower jaw does show mass below tooth #17 that is lateral of the mandible and goes slightly inferior into the submandibular area.  Does have tenderness to palpation.  No obvious tenderness crepitus.  Voice is clear and without muffling.  Tympanic membranes are clear.  NECK:  Supple.  Trachea is midline.  No lymphadenopathy or tenderness.  No carotid bruits present on auscultation.  No obvious mass noted to  palpation in the soft tissues of the neck.  LUNGS:  Breathing is easy.  Breath sounds are equal and clear bilaterally.  No wheezes, rhonchi, or rales.  HEART:  Regular rate and rhythm with normal S1 and S2.  No murmurs, gallops, or rubs.  NEUROLOGIC:  No focal sensory or motor deficits are noted.    PSYCHIATRIC:  The patient is awake, alert, and oriented x4.  Recent and remote memory is intact.  Appropriate mood and affect.  Calm and cooperative with history and physical exam.  SKIN:  Warm, dry, and well perfused.  Good turgor.  Swelling/mass to left lower mandible.  No obvious erythema or open areas.    Comment: Discrepancies between my note and notes on behalf of the nursing team or other care providers are secondary to my findings reflecting my physical examination and questioning of the patient.  Any conflicting information provided is not in line with my examination of the patient.       ED Course              ED Course as of 09/22/23 1645   Fri Sep 22, 2023   1308 In to see patient and history/physical completed.    1314 Labs ordered.   1342 Patient having worsening trismus, patient will need higher level of care.  Called ENT at [facility] Dr. Montejo who will call back.   1550 Dr. Montejo recommends oral surgery be involved.  For this reason discussed case with Dr. Melo who recommends transfer.  For this reason discussed case with Hospitalist Dr. Luna who graciously accepted patient for transfer.                 Results for orders placed or performed during the hospital encounter of 09/22/23 (from the past 24 hour(s))   CBC with platelets differential    Narrative    The following orders were created for panel order CBC with platelets differential.  Procedure                               Abnormality         Status                     ---------                               -----------         ------                     CBC with platelets and d...[[id number]]                      Final  result                 Please view results for these tests on the individual orders.   Basic metabolic panel   Result Value Ref Range    Sodium 139 136 - 145 mmol/L    Potassium 3.7 3.4 - 5.3 mmol/L    Chloride 102 98 - 107 mmol/L    Carbon Dioxide (CO2) 27 22 - 29 mmol/L    Anion Gap 10 7 - 15 mmol/L    Urea Nitrogen 15.7 6.0 - 20.0 mg/dL    Creatinine 0.97 0.67 - 1.17 mg/dL    Calcium 9.5 8.6 - 10.0 mg/dL    Glucose 106 (H) 70 - 99 mg/dL    GFR Estimate >90 >60 mL/min/1.73m2   [facility] Draw    Narrative    The following orders were created for panel order [facility] Draw.  Procedure                               Abnormality         Status                     ---------                               -----------         ------                     Extra Blue Top Tube[[id number]]                              Final result               Extra Red Top Tube[[id number]]                               Final result               Extra Green Top (Lithium...[[id number]]                      Final result               Extra Purple Top Tube[[id number]]                            Final result                 Please view results for these tests on the individual orders.   CBC with platelets and differential   Result Value Ref Range    WBC Count 10.8 4.0 - 11.0 10e3/uL    RBC Count 4.62 4.40 - 5.90 10e6/uL    Hemoglobin 14.4 13.3 - 17.7 g/dL    Hematocrit 42.5 40.0 - 53.0 %    MCV 92 78 - 100 fL    MCH 31.2 26.5 - 33.0 pg    MCHC 33.9 31.5 - 36.5 g/dL    RDW 12.7 10.0 - 15.0 %    Platelet Count 246 150 - 450 10e3/uL    % Neutrophils 75 %    % Lymphocytes 15 %    % Monocytes 10 %    % Eosinophils 0 %    % Basophils 0 %    % Immature Granulocytes 0 %    NRBCs per 100 WBC 0 <1 /100    Absolute Neutrophils 7.9 1.6 - 8.3 10e3/uL    Absolute Lymphocytes 1.7 0.8 - 5.3 10e3/uL    Absolute Monocytes 1.1 0.0 - 1.3 10e3/uL    Absolute Eosinophils 0.0 0.0 - 0.7 10e3/uL    Absolute Basophils 0.0 0.0 - 0.2 10e3/uL    Absolute Immature Granulocytes 0.0  <=0.4 10e3/uL    Absolute NRBCs 0.0 10e3/uL   Extra Blue Top Tube   Result Value Ref Range    Hold Specimen JIC    Extra Red Top Tube   Result Value Ref Range    Hold Specimen JIC    Extra Green Top (Lithium Heparin) Tube   Result Value Ref Range    Hold Specimen JIC    Extra Purple Top Tube   Result Value Ref Range    Hold Specimen JIC        Medications   clindamycin (CLEOCIN) 600 mg in 50 mL D5W intermittent infusion (600 mg Intravenous $New Bag 9/22/23 1633)   metroNIDAZOLE (FLAGYL) infusion 500 mg (500 mg Intravenous $New Bag 9/22/23 1527)       Assessments & Plan (with Medical Decision Making)     I have reviewed the nursing notes.    I have reviewed the findings, diagnosis, plan and need for follow up with the patient.      Summary:  Patient presents to the ER today for dental infection.  Potential diagnosis which have been considered and evaluated include dental infection, dental abscess, Jamie's angina, soft tissue infection/abscess, as well as others. Many of these have been excluded using the various modalities and assessment as noted on the chart. At the present time, the diagnosis given seems to be the most likely dental infection with trismus.  Upon arrival, vitals signs are normal.  The patient is alert and oriented.  No airway abnormality is present.  Patient does have trismus with ability to only open mouth 0.8 cm when 3 days prior was 4.5 cm.  Mass noted to palpation underneath tooth #18 that is tender to palpation.  No crepitus noted.  Minimally extends into the submandibular area.  With patient already on oral antibiotics therapy, no IV antibiotics started in the ER.  Lab work was obtained showing WBC of 10.8 with hemoglobin 14.4.  Electrolytes and renal functions normal.  CT was already conducted 3 days prior so repeat imaging was not conducted.  Due to worsening trismus, did discuss case with [facility] ENT Dr. Montejo who believes patient needs to be admitted but deferred case to  oral surgeon.  For this reason discussed case with oral surgeon Dr. Melo who recommends IV antibiotics and transfer.  For this reason discussed case with Hospitalist Dr. Luna who graciously accepted patient for transfer.  Oral surgeon did recommend IV antibiotics so clindamycin 600 mg IV and metronidazole 500 mg IV given prior to transfer.  Patient transferred via private vehicle.      Critical Care Time: None    Impression and plan discussed with patient. Questions answered, concerns addressed, indications for urgent re-evaluation reviewed, and  given. Patient/Parent/Caregiver agree with treatment plan and have no further questions at this time.      This note was created by the Dragon Voice Dictation System. Inadvertent typographical errors, due to software recognition problems, may still exist.             New Prescriptions    No medications on file       Final diagnoses:   Odontogenic infection of jaw   Trismus       9/22/2023   [facility]       Omega Levine, PEGGY CNP  09/22/23 3356

## 2024-03-06 ENCOUNTER — HOSPITAL ENCOUNTER (EMERGENCY)
Facility: HOSPITAL | Age: 29
Discharge: HOME | End: 2024-03-06
Attending: NURSE PRACTITIONER | Admitting: NURSE PRACTITIONER
Payer: COMMERCIAL

## 2024-03-06 VITALS
TEMPERATURE: 97.3 F | HEIGHT: 74 IN | WEIGHT: 195 LBS | SYSTOLIC BLOOD PRESSURE: 124 MMHG | DIASTOLIC BLOOD PRESSURE: 86 MMHG | OXYGEN SATURATION: 97 % | BODY MASS INDEX: 25.03 KG/M2 | HEART RATE: 95 BPM | RESPIRATION RATE: 18 BRPM

## 2024-03-06 DIAGNOSIS — R11.2 NAUSEA AND VOMITING: ICD-10-CM

## 2024-03-06 DIAGNOSIS — R11.2 NAUSEA AND VOMITING, UNSPECIFIED VOMITING TYPE: Primary | ICD-10-CM

## 2024-03-06 PROCEDURE — G0463 HOSPITAL OUTPT CLINIC VISIT: HCPCS

## 2024-03-06 PROCEDURE — 99212 OFFICE O/P EST SF 10 MIN: CPT | Performed by: NURSE PRACTITIONER

## 2024-03-06 ASSESSMENT — ENCOUNTER SYMPTOMS
EYE REDNESS: 0
VOMITING: 1
SORE THROAT: 0
COUGH: 0
RHINORRHEA: 0
NAUSEA: 1
DIARRHEA: 0
SHORTNESS OF BREATH: 0
HEADACHES: 0
EYE DISCHARGE: 0
CHILLS: 0
MYALGIAS: 0
PSYCHIATRIC NEGATIVE: 1
FEVER: 0
ABDOMINAL PAIN: 0

## 2024-03-06 ASSESSMENT — ACTIVITIES OF DAILY LIVING (ADL): ADLS_ACUITY_SCORE: 35

## 2024-03-06 NOTE — Clinical Note
Javier Michelle was seen and treated in our emergency department on 3/6/2024.    Please excuse from work on 3/5/2024 and 3/6/2024.  Thank you     Sincerely,     [facility]

## 2024-03-06 NOTE — ED TRIAGE NOTES
Pt presents requesting work note. Pt reports vomiting the last two days. Denies desire for covid, influenza and RSV testing

## 2024-03-06 NOTE — DISCHARGE INSTRUCTIONS
Push fluids    Follow-up with primary care provider or return to urgent care/ED with any worsening in condition or additional concerns

## 2024-03-06 NOTE — ED PROVIDER NOTES
History     Chief Complaint   Patient presents with    Letter for School/Work     Needs work excuse for missing     HPI  Javier Martinez is a 28 year old male who presents urgent care today ambulatory with complaints of nausea and vomiting since yesterday morning.  Denies any abdominal pain.  Denies any fever, chills, diarrhea, shortness of breath or chest pain.  No rashes.  Staying hydrated.  Declines anything for nausea.  Declines any COVID, influenza or RSV testing.  Declines any labs.  Patient states that symptoms are gradually improving.  Primary concern today is to get a work note.  No other concerns.    Allergies:  Allergies   Allergen Reactions    Bee Venom Anaphylaxis    Hornets Anaphylaxis    Amoxicillin     Amoxicillin-Pot Clavulanate        Problem List:    Patient Active Problem List    Diagnosis Date Noted    Allergic rhinitis due to other allergen 04/08/2008     Priority: Medium        Past Medical History:    No past medical history on file.    Past Surgical History:    Past Surgical History:   Procedure Laterality Date    MYRINGOTOMY, INSERT TUBE BILATERAL, COMBINED         Family History:    Family History   Problem Relation Age of Onset    Eczema Mother     Migraines Mother     Eczema Father     Migraines Father     Eczema Sister     Migraines Sister        Social History:  Marital Status:  Single [1]  Social History     Tobacco Use    Smoking status: Former     Types: Vaping Device    Smokeless tobacco: Never   Vaping Use    Vaping Use: Former    Substances: Nicotine    Devices: Refillable tank   Substance Use Topics    Alcohol use: Yes     Comment: occasional    Drug use: Never        Medications:    chlorhexidine (PERIDEX) 0.12 % solution  clindamycin (CLEOCIN) 150 MG capsule  EPINEPHrine (ANY BX GENERIC EQUIV) 0.3 MG/0.3ML injection 2-pack  EPINEPHrine (ANY BX GENERIC EQUIV) 0.3 MG/0.3ML injection 2-pack  ibuprofen (ADVIL/MOTRIN) 800 MG tablet  ibuprofen (ADVIL/MOTRIN) 800 MG  "tablet  tiZANidine (ZANAFLEX) 2 MG tablet      Review of Systems   Constitutional:  Negative for chills and fever.   HENT:  Negative for congestion, ear pain, rhinorrhea and sore throat.    Eyes:  Negative for discharge and redness.   Respiratory:  Negative for cough and shortness of breath.    Cardiovascular:  Negative for chest pain.   Gastrointestinal:  Positive for nausea and vomiting. Negative for abdominal pain and diarrhea.   Genitourinary:  Negative for decreased urine volume.   Musculoskeletal:  Negative for myalgias.   Skin:  Negative for rash.   Neurological:  Negative for headaches.   Psychiatric/Behavioral: Negative.       Physical Exam   BP: 124/86  Pulse: 95  Temp: 97.3  F (36.3  C)  Resp: 18  Height: 188 cm (6' 2\")  Weight: 88.5 kg (195 lb)  SpO2: 97 %    Physical Exam  Vitals and nursing note reviewed.   Constitutional:       General: He is not in acute distress.     Appearance: Normal appearance. He is not ill-appearing or toxic-appearing.   HENT:      Right Ear: Tympanic membrane, ear canal and external ear normal.      Left Ear: Tympanic membrane, ear canal and external ear normal.      Nose: Nose normal.      Mouth/Throat:      Mouth: Mucous membranes are moist.      Pharynx: Oropharynx is clear. No oropharyngeal exudate or posterior oropharyngeal erythema.   Cardiovascular:      Rate and Rhythm: Normal rate and regular rhythm.      Pulses: Normal pulses.      Heart sounds: Normal heart sounds.   Pulmonary:      Effort: Pulmonary effort is normal.      Breath sounds: Normal breath sounds.   Abdominal:      General: Bowel sounds are normal. There is no distension.      Palpations: Abdomen is soft.      Tenderness: There is no abdominal tenderness.   Skin:     General: Skin is warm and dry.   Neurological:      Mental Status: He is alert.   Psychiatric:         Mood and Affect: Mood normal.       ED Course     No results found for this or any previous visit (from the past 24 " hour(s)).    Medications - No data to display    Assessments & Plan (with Medical Decision Making)     I have reviewed the nursing notes.    I have reviewed the findings, diagnosis, plan and need for follow up with the patient.  (R11.2) Nausea and vomiting  Plan:   Patient ambulatory with a nontoxic appearance.  Lungs clear throughout.  No signs of otitis media or strep.  Staying hydrated.  No rashes.  No abdominal tenderness or distention.  Nausea and vomiting started yesterday, patient states that symptoms have improved today from yesterday.  Primary concern today is work note, declines any workup.  Work note given to be out of work yesterday and today.  Push fluids.  Follow-up with primary care provider or return to urgent care/ED with any worsening in condition or additional concerns.  Patient in agreement treatment plan.    Discharge Medication List as of 3/6/2024 11:03 AM        Final diagnoses:   Nausea and vomiting     3/6/2024   [facility]       Jossy Rogers NP  03/06/24 1108

## 2025-01-30 ENCOUNTER — TELEPHONE (OUTPATIENT)
Dept: FAMILY MEDICINE | Facility: OTHER | Age: 30
End: 2025-01-30

## 2025-01-30 NOTE — TELEPHONE ENCOUNTER
10:04 AM    Reason for Call: OVERBOOK    Patient is having the following symptoms: SICK FOR PAST FEW DAYS - COUGH, FEVER 102.5 IN AM, DIARRHEA, EAR PAIN, CHEST TIGHTNESS AFTER COUGHING, HEADACHE, BODY ACHES  - WORK NOTE NEEDED     The patient is requesting an appointment for ASAP with ANY PROVIDER.    Was an appointment offered for this call? No  If yes : Appointment type              Date    Preferred method for responding to this message: Telephone Call  What is your phone number ? [phone]     If we cannot reach you directly, may we leave a detailed response at the number you provided? Yes    Can this message wait until your PCP/provider returns, if unavailable today? Not applicable    Sabine Posada

## 2025-01-30 NOTE — TELEPHONE ENCOUNTER
If just needing work note, this is fine. If concerned about pneumonia, needs to be seen. If questioning, COVID/FLU, should either be seen or take at home test prior to visit.

## 2025-01-30 NOTE — TELEPHONE ENCOUNTER
Patient states thinks its just a bug. At home testing was negative. Would like work note for tomorrow and sat.

## 2025-04-01 ENCOUNTER — TELEPHONE (OUTPATIENT)
Dept: FAMILY MEDICINE | Facility: OTHER | Age: 30
End: 2025-04-01

## 2025-04-01 NOTE — TELEPHONE ENCOUNTER
Can fit in at 3:30 today. Otherwise, urgent care recommended. If ongoing less than a week, recommend at home COVID/FLU test as well. No active migraine listed in chart. I may have opening Thursday or tomorrow with covering provider.

## 2025-04-01 NOTE — TELEPHONE ENCOUNTER
8:18 AM    Reason for Call: Phone Call    Description: Pt called in saying he has a sinus infection and a migraine, Pt was hoping for an appointment today. Advised that there is nothing available. Pt asked if Provider could give him a work note. Please call Pt back.     Was an appointment offered for this call? No  If yes : Appointment type              Date    Preferred method for responding to this message: Telephone Call  What is your phone number ? [phone]    If we cannot reach you directly, may we leave a detailed response at the number you provided? Yes    Can this message wait until your PCP/provider returns, if available today? YES    Cooper Dobbins

## 2025-04-02 ENCOUNTER — E-VISIT (OUTPATIENT)
Dept: URGENT CARE | Facility: CLINIC | Age: 30
End: 2025-04-02

## 2025-04-02 ENCOUNTER — HOSPITAL ENCOUNTER (EMERGENCY)
Facility: HOSPITAL | Age: 30
Discharge: HOME | End: 2025-04-02
Attending: NURSE PRACTITIONER
Payer: COMMERCIAL

## 2025-04-02 VITALS
SYSTOLIC BLOOD PRESSURE: 139 MMHG | TEMPERATURE: 97.8 F | RESPIRATION RATE: 20 BRPM | OXYGEN SATURATION: 97 % | HEART RATE: 89 BPM | DIASTOLIC BLOOD PRESSURE: 93 MMHG

## 2025-04-02 DIAGNOSIS — R05.1 ACUTE COUGH: Primary | ICD-10-CM

## 2025-04-02 DIAGNOSIS — R06.02 SOB (SHORTNESS OF BREATH): ICD-10-CM

## 2025-04-02 DIAGNOSIS — B34.9 ACUTE VIRAL SYNDROME: Primary | ICD-10-CM

## 2025-04-02 LAB
FLUAV RNA SPEC QL NAA+PROBE: NEGATIVE
FLUBV RNA RESP QL NAA+PROBE: NEGATIVE
RSV RNA SPEC NAA+PROBE: NEGATIVE
SARS-COV-2 RNA RESP QL NAA+PROBE: NEGATIVE

## 2025-04-02 PROCEDURE — 87637 SARSCOV2&INF A&B&RSV AMP PRB: CPT | Performed by: NURSE PRACTITIONER

## 2025-04-02 PROCEDURE — 99207 PR NON-BILLABLE SERV PER CHARTING: CPT | Performed by: FAMILY MEDICINE

## 2025-04-02 PROCEDURE — 99213 OFFICE O/P EST LOW 20 MIN: CPT | Performed by: NURSE PRACTITIONER

## 2025-04-02 PROCEDURE — G0463 HOSPITAL OUTPT CLINIC VISIT: HCPCS

## 2025-04-02 ASSESSMENT — ENCOUNTER SYMPTOMS
CHILLS: 0
FEVER: 0
RHINORRHEA: 1
EYE DISCHARGE: 0
TROUBLE SWALLOWING: 0
COUGH: 1
SORE THROAT: 1
SHORTNESS OF BREATH: 0
VOMITING: 1
EYE REDNESS: 0
ABDOMINAL PAIN: 0
MYALGIAS: 1
DIARRHEA: 1
NAUSEA: 0
HEADACHES: 1

## 2025-04-02 NOTE — PATIENT INSTRUCTIONS
Dear Javier Martinez,    We are sorry you are not feeling well. Based on the responses you provided, it is recommended that you be seen in-person in urgent care so we can better evaluate your symptoms. Please click here to find the nearest urgent care location to you.   You will not be charged for this Visit. Thank you for trusting us with your care.    PEGGY Melendez CNP

## 2025-04-02 NOTE — Clinical Note
Javier Martinez was seen and treated in our emergency department on 4/2/2025.    Please excuse from work on 4/1/2025 and 4/2/2025.  Patient has a pending COVID, influenza, RSV test please follow CDC's recommendations if results are positive.  Thank you     Sincerely,     [facility]

## 2025-04-02 NOTE — ED PROVIDER NOTES
History     Chief Complaint   Patient presents with    URI     HPI  Javier Martinez is a 29 year old male who presents to urgent care today ambulatory with complaints of congestion, rhinorrhea, sore throat (with coughing), cough, vomiting, diarrhea, myalgia and headache which started 2 days ago.  Recent exposure to RSV by nephew.  No asthma.  Non-smoker.  Staying hydrated.  No rashes.  Denies any fever, chills, shortness of breath or chest pain.  Declines any strep testing.  Wants COVID, influenza and RSV testing.  Wants work note.  Declines any medication for headache, states he took Advil Cold and Sinus which is helpful.  No other concerns.    Allergies:  Allergies   Allergen Reactions    Bee Venom Anaphylaxis    Hornets Anaphylaxis    Amoxicillin     Amoxicillin-Pot Clavulanate        Problem List:    Patient Active Problem List    Diagnosis Date Noted    Allergic rhinitis due to other allergen 04/08/2008     Priority: Medium        Past Medical History:    No past medical history on file.    Past Surgical History:    Past Surgical History:   Procedure Laterality Date    MYRINGOTOMY, INSERT TUBE BILATERAL, COMBINED         Family History:    Family History   Problem Relation Age of Onset    Eczema Mother     Migraines Mother     Eczema Father     Migraines Father     Eczema Sister     Migraines Sister        Social History:  Marital Status:  Single [1]  Social History     Tobacco Use    Smoking status: Former     Types: Vaping Device    Smokeless tobacco: Never   Vaping Use    Vaping status: Former    Substances: Nicotine    Devices: Refillable tank   Substance Use Topics    Alcohol use: Yes     Comment: occasional    Drug use: Never        Medications:    chlorhexidine (PERIDEX) 0.12 % solution  clindamycin (CLEOCIN) 150 MG capsule  EPINEPHrine (ANY BX GENERIC EQUIV) 0.3 MG/0.3ML injection 2-pack  EPINEPHrine (ANY BX GENERIC EQUIV) 0.3 MG/0.3ML injection 2-pack  ibuprofen (ADVIL/MOTRIN) 800 MG  tablet  ibuprofen (ADVIL/MOTRIN) 800 MG tablet  tiZANidine (ZANAFLEX) 2 MG tablet      Review of Systems   Constitutional:  Negative for chills and fever.   HENT:  Positive for congestion, rhinorrhea and sore throat. Negative for ear pain and trouble swallowing.    Eyes:  Negative for discharge and redness.   Respiratory:  Positive for cough. Negative for shortness of breath.    Cardiovascular:  Negative for chest pain.   Gastrointestinal:  Positive for diarrhea and vomiting (once). Negative for abdominal pain and nausea.   Genitourinary:  Negative for decreased urine volume.   Musculoskeletal:  Positive for myalgias. Negative for gait problem.   Skin:  Negative for rash.   Neurological:  Positive for headaches.     Physical Exam   BP: (!) 139/93  Pulse: 89  Temp: 97.8  F (36.6  C)  Resp: 20  SpO2: 97 %    Physical Exam  Vitals and nursing note reviewed.   Constitutional:       General: He is not in acute distress.     Appearance: Normal appearance. He is not ill-appearing or toxic-appearing.   HENT:      Right Ear: Tympanic membrane, ear canal and external ear normal.      Left Ear: Tympanic membrane, ear canal and external ear normal.      Nose: Congestion and rhinorrhea present.      Mouth/Throat:      Mouth: Mucous membranes are moist.      Pharynx: Oropharynx is clear. No oropharyngeal exudate or posterior oropharyngeal erythema.   Cardiovascular:      Rate and Rhythm: Normal rate and regular rhythm.      Pulses: Normal pulses.      Heart sounds: Normal heart sounds.   Pulmonary:      Effort: Pulmonary effort is normal.      Breath sounds: Normal breath sounds.   Abdominal:      General: Bowel sounds are normal.      Palpations: Abdomen is soft.      Tenderness: There is no abdominal tenderness.   Neurological:      Mental Status: He is alert.   Psychiatric:         Mood and Affect: Mood normal.       ED Course     Procedures    No results found for this or any previous visit (from the past 24  hours).    Medications - No data to display    Assessments & Plan (with Medical Decision Making)     I have reviewed the nursing notes.    I have reviewed the findings, diagnosis, plan and need for follow up with the patient.  (B34.9) Acute viral syndrome  (primary encounter diagnosis)  Plan:   Patient ambulatory with a nontoxic appearance.  Lungs clear throughout.  No signs of otitis media or strep.  No neck pain or stiffness.  No abdominal pain or tenderness.  No rashes.  Staying hydrated.  COVID, influenza and RSV test in process, will notify results once it finalized.  Recent exposure to RSV.  Symptomatic treatment recommendations provided.  Alternate Tylenol and ibuprofen as needed for pain or fever.  Push fluids to stay hydrated.  Warm salt water gargles or honey as needed for sore throat.  Over-the-counter Flonase as needed for nasal congestion.  Follow-up with primary care provider or return to urgent care/ED with any worsening in condition or additional concerns.  Patient in agreement treatment plan.  Work note given.    Discharge Medication List as of 4/2/2025 12:54 PM        Final diagnoses:   Acute viral syndrome     4/2/2025   [facility]       Jossy Rogers, JOSE MANUEL  04/02/25 [id number]

## 2025-04-02 NOTE — TELEPHONE ENCOUNTER
Call returned to patient, updates from Lianne Castro have been provided. Patient reports slowly feeling better, declined going to urgent care due to cost, an appointment was offered with Lianne Castro for 4/3/25, patient declined appointment at this time.     Notified unable to provide a work excuse letter unless patient is seen in clinic.     Patient verbalized understanding.

## 2025-04-02 NOTE — ED TRIAGE NOTES
Pt presents with c/o having an increased cough and congestion and body aches   Reports intermittent headaches and sore throat but denies wanting strep   Was exposed to RSV before getting sick so would like Covid multi and letter for work   S/x started 2 days ago  Pt had Advil cold and sinus and robitussin today

## 2025-04-19 ENCOUNTER — HEALTH MAINTENANCE LETTER (OUTPATIENT)
Age: 30
End: 2025-04-19

## 2025-08-22 ENCOUNTER — HOSPITAL ENCOUNTER (EMERGENCY)
Facility: HOSPITAL | Age: 30
Discharge: HOME | End: 2025-08-22
Attending: PHYSICIAN ASSISTANT | Admitting: PHYSICIAN ASSISTANT
Payer: COMMERCIAL

## 2025-08-22 VITALS
HEART RATE: 83 BPM | RESPIRATION RATE: 18 BRPM | TEMPERATURE: 98.8 F | DIASTOLIC BLOOD PRESSURE: 93 MMHG | OXYGEN SATURATION: 98 % | SYSTOLIC BLOOD PRESSURE: 137 MMHG

## 2025-08-22 DIAGNOSIS — J06.9 VIRAL URI WITH COUGH: Primary | ICD-10-CM

## 2025-08-22 PROCEDURE — G0463 HOSPITAL OUTPT CLINIC VISIT: HCPCS | Performed by: PHYSICIAN ASSISTANT

## 2025-08-22 PROCEDURE — 99212 OFFICE O/P EST SF 10 MIN: CPT | Performed by: PHYSICIAN ASSISTANT

## 2025-08-22 ASSESSMENT — COLUMBIA-SUICIDE SEVERITY RATING SCALE - C-SSRS
6. HAVE YOU EVER DONE ANYTHING, STARTED TO DO ANYTHING, OR PREPARED TO DO ANYTHING TO END YOUR LIFE?: NO
1. IN THE PAST MONTH, HAVE YOU WISHED YOU WERE DEAD OR WISHED YOU COULD GO TO SLEEP AND NOT WAKE UP?: NO
2. HAVE YOU ACTUALLY HAD ANY THOUGHTS OF KILLING YOURSELF IN THE PAST MONTH?: NO